# Patient Record
Sex: FEMALE | Race: WHITE | NOT HISPANIC OR LATINO | Employment: OTHER | URBAN - METROPOLITAN AREA
[De-identification: names, ages, dates, MRNs, and addresses within clinical notes are randomized per-mention and may not be internally consistent; named-entity substitution may affect disease eponyms.]

---

## 2022-07-17 ENCOUNTER — HOSPITAL ENCOUNTER (EMERGENCY)
Facility: HOSPITAL | Age: 87
Discharge: HOME | End: 2022-07-17
Attending: EMERGENCY MEDICINE
Payer: MEDICARE

## 2022-07-17 VITALS
DIASTOLIC BLOOD PRESSURE: 97 MMHG | SYSTOLIC BLOOD PRESSURE: 230 MMHG | TEMPERATURE: 98 F | HEIGHT: 58 IN | OXYGEN SATURATION: 98 % | BODY MASS INDEX: 19.94 KG/M2 | HEART RATE: 74 BPM | WEIGHT: 95 LBS | RESPIRATION RATE: 18 BRPM

## 2022-07-17 DIAGNOSIS — S22.010A COMPRESSION FRACTURE OF T1 VERTEBRA, INITIAL ENCOUNTER: ICD-10-CM

## 2022-07-17 DIAGNOSIS — S22.020A COMPRESSION FRACTURE OF T2 VERTEBRA, INITIAL ENCOUNTER: ICD-10-CM

## 2022-07-17 DIAGNOSIS — I10 ELEVATED BLOOD PRESSURE READING WITH DIAGNOSIS OF HYPERTENSION: ICD-10-CM

## 2022-07-17 DIAGNOSIS — S00.03XA CONTUSION OF SCALP, INITIAL ENCOUNTER: ICD-10-CM

## 2022-07-17 DIAGNOSIS — S09.90XA CLOSED HEAD INJURY, INITIAL ENCOUNTER: Primary | ICD-10-CM

## 2022-07-17 DIAGNOSIS — S01.01XA LACERATION OF SCALP, INITIAL ENCOUNTER: ICD-10-CM

## 2022-07-17 PROCEDURE — 25000003 PHARM REV CODE 250: Performed by: EMERGENCY MEDICINE

## 2022-07-17 PROCEDURE — 72125 CT CERVICAL SPINE WITHOUT CONTRAST: ICD-10-PCS | Mod: 26,,, | Performed by: RADIOLOGY

## 2022-07-17 PROCEDURE — 12002 RPR S/N/AX/GEN/TRNK2.6-7.5CM: CPT

## 2022-07-17 PROCEDURE — 70450 CT HEAD WITHOUT CONTRAST: ICD-10-PCS | Mod: 26,,, | Performed by: RADIOLOGY

## 2022-07-17 PROCEDURE — 72125 CT NECK SPINE W/O DYE: CPT | Mod: TC

## 2022-07-17 PROCEDURE — 99284 EMERGENCY DEPT VISIT MOD MDM: CPT | Mod: 25

## 2022-07-17 PROCEDURE — 72125 CT NECK SPINE W/O DYE: CPT | Mod: 26,,, | Performed by: RADIOLOGY

## 2022-07-17 PROCEDURE — 70450 CT HEAD/BRAIN W/O DYE: CPT | Mod: 26,,, | Performed by: RADIOLOGY

## 2022-07-17 PROCEDURE — 70450 CT HEAD/BRAIN W/O DYE: CPT | Mod: TC

## 2022-07-17 PROCEDURE — 12001 RPR S/N/AX/GEN/TRNK 2.5CM/<: CPT

## 2022-07-17 RX ORDER — ACETAMINOPHEN 325 MG/1
650 TABLET ORAL
Status: COMPLETED | OUTPATIENT
Start: 2022-07-17 | End: 2022-07-17

## 2022-07-17 RX ADMIN — ACETAMINOPHEN 650 MG: 325 TABLET ORAL at 08:07

## 2022-07-17 RX ADMIN — Medication: at 08:07

## 2022-07-18 ENCOUNTER — HOSPITAL ENCOUNTER (EMERGENCY)
Facility: HOSPITAL | Age: 87
Discharge: HOME | End: 2022-07-18
Attending: EMERGENCY MEDICINE
Payer: MEDICARE

## 2022-07-18 VITALS
DIASTOLIC BLOOD PRESSURE: 91 MMHG | RESPIRATION RATE: 18 BRPM | WEIGHT: 100 LBS | TEMPERATURE: 98 F | HEIGHT: 58 IN | HEART RATE: 93 BPM | SYSTOLIC BLOOD PRESSURE: 209 MMHG | BODY MASS INDEX: 20.99 KG/M2 | OXYGEN SATURATION: 98 %

## 2022-07-18 DIAGNOSIS — S12.9XXA: Primary | ICD-10-CM

## 2022-07-18 PROCEDURE — 12001 RPR S/N/AX/GEN/TRNK 2.5CM/<: CPT

## 2022-07-18 PROCEDURE — 25000003 PHARM REV CODE 250: Performed by: PHYSICIAN ASSISTANT

## 2022-07-18 PROCEDURE — 99285 EMERGENCY DEPT VISIT HI MDM: CPT | Mod: 25

## 2022-07-18 RX ORDER — LIDOCAINE HYDROCHLORIDE 10 MG/ML
10 INJECTION INFILTRATION; PERINEURAL
Status: COMPLETED | OUTPATIENT
Start: 2022-07-18 | End: 2022-07-18

## 2022-07-18 RX ADMIN — LIDOCAINE HYDROCHLORIDE 10 ML: 10 INJECTION, SOLUTION INFILTRATION; PERINEURAL at 05:07

## 2022-07-18 NOTE — ED PROVIDER NOTES
Encounter Date: 7/18/2022    SCRIBE #1 NOTE: I, Stephanie Ellsworth, am scribing for, and in the presence of, Geni Wesley PA-C.       History     Chief Complaint   Patient presents with    Fall     C 7 fracture / seen [facility]      Time seen by provider: 3:34 PM on 07/18/2022    Joann Saleem is a 89 y.o. female with a PMHx of HTN who presents to the ED for evaluation of possible fracture. Patient was seen in the [place] ED yesterday after a fall and had a CT of the head and C-spine. She was discharged then called back this morning due to indeterminate age of compression fracture at C7. Patients daughter reports she had an MRI in 2020 that showed slight wedging at C7. Daughter reports patient started back on infusions this month for osteoporosis. The patient does report her neck feels stiff but she denies neck pain, numbness or any other symptoms at this time. No pertinent PSHx.      The history is provided by the patient, medical records and a relative.     Review of patient's allergies indicates:   Allergen Reactions    Pcn [penicillins]     Sulfa (sulfonamide antibiotics)      Past Medical History:   Diagnosis Date    Hypertension     Hypothyroidism, unspecified     Osteopetrosis      No past surgical history on file.  No family history on file.  Social History     Tobacco Use    Smoking status: Never Smoker    Smokeless tobacco: Never Used   Substance Use Topics    Alcohol use: Never    Drug use: Never     Review of Systems   Constitutional: Negative for activity change, appetite change, chills and fever.   HENT: Negative for congestion, rhinorrhea and sore throat.    Eyes: Negative for redness and visual disturbance.   Respiratory: Negative for cough, chest tightness and shortness of breath.    Cardiovascular: Negative for chest pain.   Gastrointestinal: Negative for abdominal pain, diarrhea, nausea and vomiting.   Genitourinary: Negative for dysuria and frequency.    Musculoskeletal: Positive for neck stiffness. Negative for back pain and neck pain.   Skin: Negative for rash.   Neurological: Negative for dizziness, syncope, numbness and headaches.       Physical Exam     Vitals:    07/18/22 1833 07/18/22 1940 07/18/22 2002 07/18/22 2032   BP: (!) 168/72 (!) 201/87 (!) 191/99 (!) 209/91   Pulse: 64 74 80 93   Resp: 20   18   Temp:       TempSrc:       SpO2: 99% 98% 97% 98%   Weight:       Height:           Physical Exam    Nursing note and vitals reviewed.  Constitutional: Vital signs are normal. She appears well-developed and well-nourished. She is cooperative.  Non-toxic appearance. She does not have a sickly appearance.   HENT:   Head: Normocephalic and atraumatic.   Right Ear: External ear normal.   Left Ear: External ear normal.   Nose: Nose normal.   Mouth/Throat: Uvula is midline.   Eyes: Conjunctivae and lids are normal.   Neck:   Neck held in flexion at baseline. No cervical spinous process tenderness. No pain with ROM.    Full passive range of motion without pain.     Cardiovascular: Normal rate, regular rhythm and normal heart sounds. Exam reveals no gallop and no friction rub.    No murmur heard.  Pulmonary/Chest: Breath sounds normal. She has no wheezes. She has no rhonchi. She has no rales.   Musculoskeletal:      Cervical back: Full passive range of motion without pain. No spinous process tenderness or muscular tenderness.     Neurological: She is alert.   Equal strength and sensation to bilateral upper extremities.    Skin: Skin is warm, dry and intact. No rash noted.         ED Course   Lac Repair    Date/Time: 7/18/2022 8:40 PM  Performed by: Geni Wesley PA-C  Authorized by: Pierre Odom MD     Consent:     Consent obtained:  Verbal    Consent given by:  Patient    Procedural risks discussed: unable to be have MRI.  [place] protocol:     Patient identity confirmed:  Verbally with patient  Anesthesia:     Anesthesia method:  Local  infiltration    Local anesthetic:  Lidocaine 1% w/o epi  Laceration details:     Location:  Scalp    Length (cm):  2  Pre-procedure details:     Preparation:  Patient was prepped and draped in usual sterile fashion  Exploration:     Limited defect created (wound extended): no    Treatment:     Area cleansed with:  Saline    Amount of cleaning:  Standard    Debridement:  None    Undermining:  None  Skin repair:     Repair method:  Sutures    Suture size:  4-0    Suture material:  Prolene    Suture technique:  Simple interrupted    Number of sutures:  3  Approximation:     Approximation:  Close  Repair type:     Repair type:  Simple  Post-procedure details:     Dressing:  Open (no dressing)    Procedure completion:  Tolerated well, no immediate complications  Comments:      Patient had 3 staples removed and replaced with sutures in order to have MRI obtained      Labs Reviewed - No data to display       Imaging Results          MRI Cervical Spine Without Contrast (Final result)  Result time 07/18/22 20:12:19    Final result by Hiram Tavera MD (07/18/22 20:12:19)                 Impression:      Mild compression deformities of C7, T1 and T2 appear remote with no bone marrow edema or retropulsion.    Generalized spondylosis with central canal stenosis due to hypertrophic bony changes and congenitally short pedicles.    Normal appearance of the cervical cord.      Electronically signed by: Hiram Tavera  Date:    07/18/2022  Time:    20:12             Narrative:    EXAMINATION:  MRI CERVICAL SPINE WITHOUT CONTRAST    CLINICAL HISTORY:  Compression fracture, cervical;.    TECHNIQUE:  Multiplanar, multisequence MR images of the cervical spine were acquired without the administration of contrast.    COMPARISON:  CT, 07/17/2022 at 20:16 hours    FINDINGS:  The compression deformities of C7 and anterior superior endplates of T1 and T2 are noted.  There is no evidence of bone marrow edema or retropulsion.  No marrow  replacement is evident.    Multilevel cervical spondylosis is present with mild anterolisthesis of C4 on C5 and C5 on C6 appearing physiologic.    There is no focal disc protrusion with generalized central canal stenosis likely due to congenitally and hypertrophic bony changes.    The cord appears normal in signal and morphology throughout the cervical and upper thoracic region.  The visualized posterior cranial fossa appears unremarkable.  The surrounding visceral spaces appear unremarkable.                                 Medications   LIDOcaine HCL 10 mg/ml (1%) injection 10 mL (10 mLs Infiltration Given 7/18/22 1235)     Medical Decision Making:   History:   Old Medical Records: I decided to obtain old medical records.  Clinical Tests:   Radiological Study: Ordered and Reviewed  Other:   I have discussed this case with another health care provider.       APC / Resident Notes:   Urgent evaluation of a well appearing 89 year old female who presents with possible C7 fracture. She was seen yesterday at [place] and the over read of the CT scan showed concern for possible C7 compression fracture.  Patient denies any neck pain.  Her posture has her neck and flexion chronically.  She has equal strength and sensation of bilateral upper extremities.  She has full passive range of motion of the neck.  She has no cervical spinous process tenderness.  Discussed case with Neurosurgery, Dr. Martin, who recommends flexion and extension x-ray of the neck.  Radiologist did not feel comfortable performing this and was unsure if it would provide much information due to her chronic flexion.  They recommended MRI.  Her staples that were placed yesterday were removed and sutures placed order for patient to have MRI which showed remote C7 compression fracture with normal appearance of the cervical cord. Discussed results with patient. Return precautions given. Based on my clinical evaluation, I do not appreciate any immediate, emergent,  or life threatening condition or etiology that warrants additional workup today and feel that the patient can be discharged with close follow up care.  Patient is to follow up with their primary care provider. Case was discussed with Dr. Odom who is in agreement with the plan of care. All questions answered.        Scribe Attestation:   Scribe #1: I performed the above scribed service and the documentation accurately describes the services I performed. I attest to the accuracy of the note.    Attending Attestation:     Physician Attestation Statement for NP/PA:   I discussed this assessment and plan of this patient with the NP/PA, but I did not personally examine the patient. The face to face encounter was performed by the NP/PA.    Other NP/PA Attestation Additions:    History of Present Illness: 89-year-old female presented for new cervical spine fracture.    Medical Decision Making: Initial differential diagnosis included but not limited to old remote fracture, new fracture, and sprain.  I am in agreement with the physician assistant's  assessment, treatment, and plan of care.                    I, Geni Wesley PA-C, personally performed the services described in this documentation. All medical record entries made by the scribe were at my direction and in my presence.  I have reviewed the chart and agree that the record reflects my personal performance and is accurate and complete. Geni Wesley PA-C.  8:40 PM 07/18/2022    Clinical Impression:   Final diagnoses:  [S12.9XXA] Compression fracture of C-spine (Primary)          ED Disposition Condition    Discharge Stable        ED Prescriptions     None        Follow-up Information     Follow up With Specialties Details Why Contact Info    Meaghan Denney MD Family Medicine   [address] [facility] [address]  [phone]      Jazz Martin MD Neurosurgery   [address] [place] [address]  [place] [address]  [phone]      [facility] Emergency Dept Emergency Medicine  As needed [address]  [facility] [address] [phone]           Geni Wesley PA-C  07/18/22 2047       Pierre Odom MD  07/18/22 2050

## 2022-07-18 NOTE — ED PROVIDER NOTES
Encounter Date: 7/17/2022       History     Chief Complaint   Patient presents with    Fall     Pt. Lost her balance and fell into a sink.  Denies LOC. Minimal bleeding noted.  Hematoma noted to the top left head. Denies taking blood thinners.      89-year-old female past medical history significant for hypertension presenting after a fall at home.  Family or at bedside reports she did not witness the fall but she heard it.  Please patient with to sit down next to the sink sat rather hard which caused her head to sway to the side striking the sink.  No reports of subsequent fall to the ground.  Patient denies loss of consciousness.  Denies neck, back, chest or abdominal pain.  Denies any complaints of pain prior to striking her head.  Denies chronic anticoagulation.  Denies focal numbness, tingling weakness.  Denies visual disturbance.  Denies nausea, vomiting or diarrhea.  Reports increased pain with direct palpation over the wound site to the left lateral/posterior head.  Denies specific alleviating factors.  Reports last tetanus vaccination less than 5 years ago.        Review of patient's allergies indicates:   Allergen Reactions    Pcn [penicillins]     Sulfa (sulfonamide antibiotics)      Past Medical History:   Diagnosis Date    Hypertension     Hypothyroidism, unspecified     Osteopetrosis      History reviewed. No pertinent surgical history.  History reviewed. No pertinent family history.  Social History     Tobacco Use    Smoking status: Never Smoker    Smokeless tobacco: Never Used   Substance Use Topics    Alcohol use: Never    Drug use: Never     Review of Systems   Constitutional: Negative for activity change, appetite change and fever.   HENT: Negative for congestion, ear pain, rhinorrhea, sore throat and trouble swallowing.    Eyes: Negative for visual disturbance.   Respiratory: Negative for cough and shortness of breath.    Cardiovascular: Negative for chest pain.   Gastrointestinal:  Negative for abdominal pain, diarrhea, nausea and vomiting.   Genitourinary: Negative for dysuria and frequency.   Musculoskeletal: Negative for back pain and myalgias.   Skin: Positive for wound. Negative for rash.   Neurological: Positive for headaches. Negative for dizziness, seizures, syncope, weakness and numbness.   Hematological: Does not bruise/bleed easily.       Physical Exam     Initial Vitals [07/17/22 1942]   BP Pulse Resp Temp SpO2   (!) 230/97 74 18 98 °F (36.7 °C) 98 %      MAP       --         Physical Exam    Nursing note and vitals reviewed.  Constitutional: She appears well-developed and well-nourished.   HENT:   Head: Normocephalic.       Right Ear: Hearing, tympanic membrane, external ear and ear canal normal.   Left Ear: Hearing, tympanic membrane, external ear and ear canal normal.   Mouth/Throat: Uvula is midline, oropharynx is clear and moist and mucous membranes are normal.   Eyes: Conjunctivae and EOM are normal. Pupils are equal, round, and reactive to light.   Neck: Trachea normal and phonation normal. Neck supple. No JVD present.   No midline cervical spine tenderness, step-off or crepitus.  Kyphosis likely chronic.   Cardiovascular: Normal rate, regular rhythm, normal heart sounds and intact distal pulses.   Abdominal: Abdomen is soft. Bowel sounds are normal. She exhibits no distension. There is no abdominal tenderness. There is no rebound and no guarding.   Musculoskeletal:         General: Normal range of motion.      Cervical back: Neck supple.      Comments: No midline thoracic, lumbar sacral spinal tenderness, step-off or crepitus.  No outward signs of trauma to the anterior posterior torso.  Full active/passive range of motion intact all 4 extremities without reported tenderness.  Pelvis stable to compression with no tenderness to compression.  Patient reports ambulatory after the incident without ataxia.     Neurological: She is alert and oriented to person, place, and time.  GCS score is 15. GCS eye subscore is 4. GCS verbal subscore is 5. GCS motor subscore is 6.   No focal/lateralizing neuro deficit.   Skin: Skin is warm and dry.         ED Course   Lac Repair    Date/Time: 7/17/2022 9:21 PM  Performed by: Francisco Camacho DO  Authorized by: Francisco Camacho DO     Consent:     Consent obtained:  Verbal    Consent given by:  Patient    Risks, benefits, and alternatives were discussed: yes      Risks discussed:  Infection, need for additional repair, nerve damage, poor wound healing, poor cosmetic result, pain, vascular damage and retained foreign body    Alternatives discussed:  Delayed treatment and no treatment  Universal protocol:     Procedure explained and questions answered to patient or proxy's satisfaction: yes      Relevant documents present and verified: yes      Test results available: yes      Imaging studies available: yes      Required blood products, implants, devices, and special equipment available: yes      Site/side marked: yes      Immediately prior to procedure, a time out was called: yes      Patient identity confirmed:  Verbally with patient, arm band and hospital-assigned identification number  Anesthesia:     Anesthesia method:  Topical application    Topical anesthetic:  LET  Laceration details:     Location:  Scalp    Length (cm):  4    Depth (mm):  4  Pre-procedure details:     Preparation:  Patient was prepped and draped in usual sterile fashion and imaging obtained to evaluate for foreign bodies  Exploration:     Limited defect created (wound extended): no      Hemostasis achieved with:  Direct pressure    Imaging outcome: foreign body noted      Wound exploration: wound explored through full range of motion and entire depth of wound visualized      Wound extent: no areolar tissue violation noted, no fascia violation noted, no foreign bodies/material noted, no muscle damage noted, no nerve damage noted, no tendon damage noted, no underlying fracture  noted and no vascular damage noted      Contaminated: no    Treatment:     Area cleansed with:  Saline    Amount of cleaning:  Standard    Irrigation solution:  Sterile saline    Irrigation volume:  250    Irrigation method:  Syringe    Foreign body removal: n/a.      Debridement:  None    Undermining:  None    Scar revision: no    Skin repair:     Repair method:  Staples    Number of staples:  3  Approximation:     Approximation:  Close  Repair type:     Repair type:  Simple  Post-procedure details:     Dressing:  Non-adherent dressing    Procedure completion:  Tolerated well, no immediate complications      Labs Reviewed - No data to display       Imaging Results          CT Cervical Spine Without Contrast (In process)                CT Head Without Contrast (In process)                  Medications   LETS (LIDOcaine-TETRAcaine-EPINEPHrine) gel solution ( Topical (Top) Given 7/17/22 2025)   acetaminophen tablet 650 mg (650 mg Oral Given 7/17/22 2023)     Medical Decision Making:   Initial Assessment:   89-year-old female nontoxic-appearing, afebrile no reports of chronic anticoagulation status post low energy traumatic injury to the left lateral/posterior parietal scalp.  No palpable bony crepitus.  No loss of consciousness.  Will CT based on age with traumatic injury rule out acute intracranial pathology.  Patient with chronic kyphosis no specific midline tenderness will CT rule out acute traumatic pathology.  Topical let with evaluation for possible staple closure.  Patient hypertensive possible acute phase reactant.  Will provide Tylenol, reassess blood pressure.  Patient with history of hypertension.  Differential Diagnosis:   Exam: CT Cervical Spine Without Contrast  Exam date and time: 7/17/2022 8:15 PM  Age: 89 years old  Clinical indication: Injury or trauma; Fall; Concussion/head injury; Injury date: 7/17/22  TECHNIQUE:  Imaging protocol: Computed tomography of the cervical spine without  contrast.  Radiation optimization: All CT scans at this facility use at least one of these dose optimization  techniques: automated exposure control; mA and/or kV adjustment per patient size (includes targeted  exams where dose is matched to clinical indication); or iterative reconstruction.  COMPARISON:  CT HEAD WITHOUT CONTRAST 7/17/2022 8:13 PM  FINDINGS:  Limitations: Kyphotic cspine and unconventional position limiting evaluation.  Bones/joints: No acute cervical spine fracture. Facet hypertrophic degenerative changes  predominantly C2-6. Mild endplate and uncovertebral spurring predominantly C5-7. Mild, 20% loss of  isaias seen at T2 and 10% loss of height at T1, suspicious for compression fractures.  Discs/Spinal canal/Neural foramina: No significant disc protrusion. No severe spinal canal stenosis.  No significant neural foraminal narrowing.  Lungs: Lung apices are normal.  Vasculature: Atherosclerotic changes bilateral cervical carotid arteries.  Soft tissues: Unremarkable.  IMPRESSION:  1. No acute cervical spine fracture.  2. There are mild degenerative changes of the cervical spine.  3. Mild, 20% loss of isaias seen at T2 and 10% loss of height at T1, suspicious for compression  fractures. Recommend clinical correlation. Consider MRI.    No midline point tenderness at T1-T2 doubt acute compression fracture.      Exam: CT Head Without Contrast  Exam date and time: 7/17/2022 8:13 PM  Age: 89 years old  Clinical indication: Injury or trauma; Fall; Concussion/head injury; Without loss of consciousness;  Injury date: 7/17/22  TECHNIQUE:  Imaging protocol: Computed tomography of the head without contrast.  Radiation optimization: All CT scans at this facility use at least one of these dose optimization  techniques: automated exposure control; mA and/or kV adjustment per patient size (includes targeted  exams where dose is matched to clinical indication); or iterative reconstruction.  COMPARISON:  No relevant  prior studies available.  FINDINGS:  Brain: No intracranial hemorrhage. Generalized cerebral volume loss/atrophy. Patchy hypodensity is  seen in the periventricular and deep cerebral white matter. This change is nonspecific but may be  related to the presence of chronic ischemia with microvascular distribution. Chronic 2.5 cm  encephalomalacia right basal ganglia  Cerebral ventricles: The ventricles are moderately enlarged.  Paranasal sinuses: Visualized sinuses are unremarkable. No fluid levels.  Mastoid air cells: Visualized mastoid air cells are well aerated.  Bones/joints: Unremarkable. No acute fracture.  Soft tissues: Left parietal scalp contusion.  Vasculature: Atherosclerotic calcifications in the intracranial carotid and vertebral artery segments.  IMPRESSION:  1. No acute intracranial hemorrhage.  2. Cerebral volume loss, chronic ischemic and white matter changes as described above.  3. Left parietal scalp contusion.  Clinical Tests:   Radiological Study: Ordered and Reviewed  ED Management:  Patient tolerated wound cleansing, closure without difficulty.  CT imaging without evidence of acute traumatic pathology.  Incidental T1/T2 compression fractures reportedly chronic per the patient as reports she had been told the past that she was not a good surgical candidate for fixation.  Patient denies chronic neck pain.  No focal/lateralizing neuro deficits.  Patient medically stable for discharge home.  Reviewed and discussed wound care, close head injury precautions and return to ED precautions.  Patient and family at bedside verbalized understanding and all their questions answered to their apparent satisfaction.                      Clinical Impression:   Final diagnoses:  [S09.90XA] Closed head injury, initial encounter (Primary)  [I10] Elevated blood pressure reading with diagnosis of hypertension  [S01.01XA] Laceration of scalp, initial encounter  [S00.03XA] Contusion of scalp, initial  encounter  [S22.010A] Compression fracture of T1 vertebra, initial encounter  [S22.020A] Compression fracture of T2 vertebra, initial encounter          ED Disposition Condition    Discharge Stable        ED Prescriptions     None        Follow-up Information     Follow up With Specialties Details Why Contact Info    Meaghan Denney MD Family Medicine Schedule an appointment as soon as possible for a visit in 2 days For wound re-check and staple removal in 7 days [address] [facility] - [facility] [address]  [phone]      [facility] Emergency Dept Emergency Medicine  As needed, If symptoms worsen [address] [facility] [address] [phone]           Francisco Camacho,   07/18/22 0502

## 2022-07-18 NOTE — ED NOTES
Assumed care:  Joann Saleem is awake, alert and oriented x 3, skin warm and dry, in NAD.  Patient states that she fell yesterday and went to ER in [place].  CT from visit shows a compression Fx to T7.  Sent by primary for eval.  Denies numbness or tingling and states that she is able to ambulate with walker.  CO pain to mid back that patient says is worse today than before fall.    Patient identifiers for Joann Saleem checked and correct.  LOC:  Jonan Saleem is awake, alert, and aware of environment with an appropriate affect. She is oriented x 3 and speaking appropriately.  APPEARANCE:  She is resting comfortably and in no acute distress. She is clean and well groomed, patient's clothing is properly fastened.  SKIN:  The skin is warm and dry. She has normal skin turgor and moist mucus membranes. 3 staples to left scalp  MUSCULOSKELETAL:  She is moving all extremities well, no obvious deformities noted. Pulses intact.  Mid back pain  RESPIRATORY:  Airway is open and patent. Respirations are spontaneous and non-labored with normal effort and rate.  CARDIAC:  She has a normal rate and rhythm. No peripheral edema noted. Capillary refill < 3 seconds.  ABDOMEN:  No distention noted.  Soft and non-tender upon palpation.  NEUROLOGICAL:  PERRL. Facial expression is symmetrical. Hand grasps are equal bilaterally. Normal sensation in all extremities when touched with finger.  Allergies reported:    Review of patient's allergies indicates:   Allergen Reactions    Pcn [penicillins]     Sulfa (sulfonamide antibiotics)      OTHER NOTES:

## 2022-07-24 ENCOUNTER — HOSPITAL ENCOUNTER (EMERGENCY)
Facility: HOSPITAL | Age: 87
Discharge: HOME | End: 2022-07-24
Attending: INTERNAL MEDICINE
Payer: MEDICARE

## 2022-07-24 VITALS
WEIGHT: 95 LBS | HEART RATE: 69 BPM | OXYGEN SATURATION: 97 % | BODY MASS INDEX: 19.94 KG/M2 | TEMPERATURE: 98 F | DIASTOLIC BLOOD PRESSURE: 68 MMHG | HEIGHT: 58 IN | SYSTOLIC BLOOD PRESSURE: 174 MMHG | RESPIRATION RATE: 18 BRPM

## 2022-07-24 DIAGNOSIS — I10 HYPERTENSION: ICD-10-CM

## 2022-07-24 DIAGNOSIS — S22.000A COMPRESSION FRACTURE OF BODY OF THORACIC VERTEBRA: Primary | ICD-10-CM

## 2022-07-24 DIAGNOSIS — S32.000A COMPRESSION FRACTURE OF LUMBAR VERTEBRA, UNSPECIFIED LUMBAR VERTEBRAL LEVEL, INITIAL ENCOUNTER: ICD-10-CM

## 2022-07-24 LAB
ANION GAP SERPL CALC-SCNC: 10 MMOL/L (ref 8–16)
BASOPHILS # BLD AUTO: 0.05 K/UL (ref 0–0.2)
BASOPHILS NFR BLD: 1.1 % (ref 0–1.9)
BUN SERPL-MCNC: 22 MG/DL (ref 10–30)
CALCIUM SERPL-MCNC: 8.6 MG/DL (ref 8.7–10.5)
CHLORIDE SERPL-SCNC: 110 MMOL/L (ref 95–110)
CO2 SERPL-SCNC: 23 MMOL/L (ref 23–29)
CREAT SERPL-MCNC: 1.3 MG/DL (ref 0.5–1.4)
DIFFERENTIAL METHOD: ABNORMAL
EOSINOPHIL # BLD AUTO: 0.1 K/UL (ref 0–0.5)
EOSINOPHIL NFR BLD: 2.6 % (ref 0–8)
ERYTHROCYTE [DISTWIDTH] IN BLOOD BY AUTOMATED COUNT: 13.6 % (ref 11.5–14.5)
EST. GFR  (AFRICAN AMERICAN): 41.4 ML/MIN/1.73 M^2
EST. GFR  (NON AFRICAN AMERICAN): 36 ML/MIN/1.73 M^2
GLUCOSE SERPL-MCNC: 97 MG/DL (ref 70–110)
HCT VFR BLD AUTO: 35.1 % (ref 37–48.5)
HGB BLD-MCNC: 11.8 G/DL (ref 12–16)
IMM GRANULOCYTES # BLD AUTO: 0.01 K/UL (ref 0–0.04)
IMM GRANULOCYTES NFR BLD AUTO: 0.2 % (ref 0–0.5)
LYMPHOCYTES # BLD AUTO: 1 K/UL (ref 1–4.8)
LYMPHOCYTES NFR BLD: 21.1 % (ref 18–48)
MCH RBC QN AUTO: 30.9 PG (ref 27–31)
MCHC RBC AUTO-ENTMCNC: 33.6 G/DL (ref 32–36)
MCV RBC AUTO: 92 FL (ref 82–98)
MONOCYTES # BLD AUTO: 0.6 K/UL (ref 0.3–1)
MONOCYTES NFR BLD: 13.6 % (ref 4–15)
NEUTROPHILS # BLD AUTO: 2.8 K/UL (ref 1.8–7.7)
NEUTROPHILS NFR BLD: 61.4 % (ref 38–73)
NRBC BLD-RTO: 0 /100 WBC
PLATELET # BLD AUTO: 152 K/UL (ref 150–450)
PMV BLD AUTO: 8.7 FL (ref 9.2–12.9)
POTASSIUM SERPL-SCNC: 4.1 MMOL/L (ref 3.5–5.1)
RBC # BLD AUTO: 3.82 M/UL (ref 4–5.4)
SODIUM SERPL-SCNC: 143 MMOL/L (ref 136–145)
WBC # BLD AUTO: 4.55 K/UL (ref 3.9–12.7)

## 2022-07-24 PROCEDURE — 99285 EMERGENCY DEPT VISIT HI MDM: CPT | Mod: 25

## 2022-07-24 PROCEDURE — 93005 ELECTROCARDIOGRAM TRACING: CPT

## 2022-07-24 PROCEDURE — 72128 CT CHEST SPINE W/O DYE: CPT | Mod: TC

## 2022-07-24 PROCEDURE — 72131 CT LUMBAR SPINE WITHOUT CONTRAST: ICD-10-PCS | Mod: 26,,, | Performed by: RADIOLOGY

## 2022-07-24 PROCEDURE — 93010 ELECTROCARDIOGRAM REPORT: CPT | Mod: ,,, | Performed by: INTERNAL MEDICINE

## 2022-07-24 PROCEDURE — 72131 CT LUMBAR SPINE W/O DYE: CPT | Mod: 26,,, | Performed by: RADIOLOGY

## 2022-07-24 PROCEDURE — 80048 BASIC METABOLIC PNL TOTAL CA: CPT | Performed by: INTERNAL MEDICINE

## 2022-07-24 PROCEDURE — 63600175 PHARM REV CODE 636 W HCPCS: Performed by: INTERNAL MEDICINE

## 2022-07-24 PROCEDURE — 36415 COLL VENOUS BLD VENIPUNCTURE: CPT | Performed by: INTERNAL MEDICINE

## 2022-07-24 PROCEDURE — 93010 EKG 12-LEAD: ICD-10-PCS | Mod: ,,, | Performed by: INTERNAL MEDICINE

## 2022-07-24 PROCEDURE — 72131 CT LUMBAR SPINE W/O DYE: CPT | Mod: TC

## 2022-07-24 PROCEDURE — 72128 CT THORACIC SPINE WITHOUT CONTRAST: ICD-10-PCS | Mod: 26,,, | Performed by: RADIOLOGY

## 2022-07-24 PROCEDURE — 96372 THER/PROPH/DIAG INJ SC/IM: CPT | Performed by: INTERNAL MEDICINE

## 2022-07-24 PROCEDURE — 72128 CT CHEST SPINE W/O DYE: CPT | Mod: 26,,, | Performed by: RADIOLOGY

## 2022-07-24 PROCEDURE — 85025 COMPLETE CBC W/AUTO DIFF WBC: CPT | Performed by: INTERNAL MEDICINE

## 2022-07-24 RX ORDER — METHENAMINE HIPPURATE 1000 MG/1
1 TABLET ORAL
COMMUNITY
Start: 2022-06-21 | End: 2023-06-21

## 2022-07-24 RX ORDER — ERGOCALCIFEROL 1.25 MG/1
50000 CAPSULE ORAL
COMMUNITY
Start: 2022-06-01

## 2022-07-24 RX ORDER — OLMESARTAN MEDOXOMIL 40 MG/1
40 TABLET ORAL DAILY
COMMUNITY
Start: 2022-06-01

## 2022-07-24 RX ORDER — LEVOTHYROXINE SODIUM 125 UG/1
125 TABLET ORAL DAILY
COMMUNITY
Start: 2022-06-01

## 2022-07-24 RX ORDER — LEVOTHYROXINE SODIUM 125 UG/1
125 TABLET ORAL
COMMUNITY
Start: 2022-06-01 | End: 2023-06-01

## 2022-07-24 RX ORDER — CHLORTHALIDONE 25 MG/1
TABLET ORAL
COMMUNITY
Start: 2022-06-01

## 2022-07-24 RX ORDER — ASPIRIN 81 MG/1
81 TABLET ORAL
COMMUNITY

## 2022-07-24 RX ORDER — METHENAMINE HIPPURATE 1000 MG/1
1 TABLET ORAL 2 TIMES DAILY
COMMUNITY
Start: 2022-06-23

## 2022-07-24 RX ORDER — AMLODIPINE BESYLATE 5 MG/1
5 TABLET ORAL
COMMUNITY

## 2022-07-24 RX ORDER — CEFUROXIME AXETIL 250 MG/1
250 TABLET ORAL 2 TIMES DAILY
COMMUNITY
Start: 2022-06-03

## 2022-07-24 RX ORDER — ERGOCALCIFEROL 1.25 MG/1
50000 CAPSULE ORAL
COMMUNITY
Start: 2022-06-01 | End: 2023-06-01

## 2022-07-24 RX ORDER — MIRABEGRON 25 MG/1
25 TABLET, FILM COATED, EXTENDED RELEASE ORAL DAILY
COMMUNITY
Start: 2022-06-21

## 2022-07-24 RX ORDER — KETOROLAC TROMETHAMINE 30 MG/ML
15 INJECTION, SOLUTION INTRAMUSCULAR; INTRAVENOUS
Status: COMPLETED | OUTPATIENT
Start: 2022-07-24 | End: 2022-07-24

## 2022-07-24 RX ORDER — OLMESARTAN MEDOXOMIL 20 MG/1
TABLET ORAL
COMMUNITY
Start: 2022-04-26

## 2022-07-24 RX ORDER — CHLORTHALIDONE 25 MG/1
25 TABLET ORAL DAILY
COMMUNITY
Start: 2022-06-01

## 2022-07-24 RX ORDER — BIMATOPROST 0.1 MG/ML
1 SOLUTION/ DROPS OPHTHALMIC NIGHTLY
COMMUNITY
Start: 2022-07-21

## 2022-07-24 RX ADMIN — KETOROLAC TROMETHAMINE 15 MG: 30 INJECTION, SOLUTION INTRAMUSCULAR; INTRAVENOUS at 06:07

## 2022-07-24 NOTE — ED PROVIDER NOTES
Encounter Date: 7/24/2022       History     Chief Complaint   Patient presents with    Back Pain     Had a fall 7/17 - pt has compression fx to C7, T1 and T2. Has been having increasing back pain over the last couple of days.     HPI  Review of patient's allergies indicates:   Allergen Reactions    Pcn [penicillins]     Sulfa (sulfonamide antibiotics)      Past Medical History:   Diagnosis Date    Hypertension     Hypothyroidism, unspecified     Osteopetrosis      No past surgical history on file.  No family history on file.  Social History     Tobacco Use    Smoking status: Never Smoker    Smokeless tobacco: Never Used   Substance Use Topics    Alcohol use: Never    Drug use: Never     Review of Systems    Physical Exam     Initial Vitals   BP Pulse Resp Temp SpO2   07/24/22 1407 07/24/22 1403 07/24/22 1403 07/24/22 1403 07/24/22 1403   (!) 196/65 84 20 97.6 °F (36.4 °C) 97 %      MAP       --                Physical Exam    ED Course   Procedures  Labs Reviewed   BASIC METABOLIC PANEL - Abnormal; Notable for the following components:       Result Value    Calcium 8.6 (*)     eGFR if  41.4 (*)     eGFR if non  36.0 (*)     All other components within normal limits   CBC W/ AUTO DIFFERENTIAL - Abnormal; Notable for the following components:    RBC 3.82 (*)     Hemoglobin 11.8 (*)     Hematocrit 35.1 (*)     MPV 8.7 (*)     All other components within normal limits        ECG Results          EKG 12-lead (Preliminary result)  Result time 07/24/22 16:07:17    ED Interpretation by Juanjose Durham MD (07/24/22 16:07:17, [facility], Emergency Medicine)    Normal sinus rhythm with first-degree AV block, heart rate 72 no acute ischemic changes                            Imaging Results          CT Lumbar Spine Without Contrast (Final result)  Result time 07/24/22 17:28:05    Final result by Kris Huizar MD (07/24/22 17:28:05)                 Impression:      1. Severe  compression fracture of the T12 vertebral body with 60% height loss and 5 mm retropulsion, contributing to moderate spinal canal stenosis.  2. Additional severe compression fractures of the L1 and L5 vertebral bodies with 50% height loss and no significant retropulsion.  3. Mild superior endplate compression fractures of the C7, T1, T2, and T11 vertebral bodies with less than 20% height loss and no retropulsion.  4. Multilevel degenerative change of the spine.  5. Exophytic hypodense left renal lesion, possibly a complex cyst.  Further characterization with nonemergent renal ultrasound recommended to exclude neoplasm.  6. Severe right hydronephrosis and nephrolithiasis partially visualized.  7. Small bilateral pleural effusions.      Electronically signed by: Kris Huizar  Date:    07/24/2022  Time:    17:28             Narrative:    EXAMINATION:  CT THORACIC SPINE WITHOUT CONTRAST; CT LUMBAR SPINE WITHOUT CONTRAST    CLINICAL HISTORY:  fall;; Low back pain, trauma;    TECHNIQUE:  CT thoracic and lumbar spine performed without contrast.  Coronal and sagittal reformats provided.    COMPARISON:  None    FINDINGS:  Osseous structures demonstrate diffuse demineralization.    Mild levoconvex curvature of the thoracic spine and dextroconvex curvature of the lumbar spine.  Exaggeration of the normal thoracic kyphosis.    Severe compression fracture of the T12 vertebral body with 60% height loss and 5 mm retropulsion contributing to moderate spinal canal stenosis. Additional severe compression fractures of the L1 and L5 vertebral bodies with 50% height loss.  No significant retropulsion.    Mild superior endplate compression fractures of the C7, T1, T2 and T11 vertebral bodies with less than 20% height loss.  Remaining vertebral body heights appear maintained.    Moderate multilevel disc degeneration with intervertebral disc space narrowing, degenerative endplate change and marginal osteophyte formation, most pronounced in  the midthoracic spine and at L5-S1.  Multilevel degenerative facet arthropathy.  Findings contribute to mild-to-moderate neural foraminal and spinal canal stenosis.  No high-grade osseous spinal canal or neural foramen stenosis.    Small bilateral pleural effusions.  Marked aortoiliac calcific atherosclerosis.  Prominent calcification of the coronary arteries.  Small hiatal hernia.  Exophytic 2.2 cm left renal mass measuring slightly higher attenuation than simple fluid, may be a complex or proteinaceous cyst.  Further evaluation with renal ultrasound recommended.  Severe right hydronephrosis and nephrolithiasis partially visualized.  Diverticulosis coli.    Atrophy of the paraspinal musculature.                               CT Thoracic Spine Without Contrast (Final result)  Result time 07/24/22 17:28:05    Final result by Kris Huizar MD (07/24/22 17:28:05)                 Impression:      1. Severe compression fracture of the T12 vertebral body with 60% height loss and 5 mm retropulsion, contributing to moderate spinal canal stenosis.  2. Additional severe compression fractures of the L1 and L5 vertebral bodies with 50% height loss and no significant retropulsion.  3. Mild superior endplate compression fractures of the C7, T1, T2, and T11 vertebral bodies with less than 20% height loss and no retropulsion.  4. Multilevel degenerative change of the spine.  5. Exophytic hypodense left renal lesion, possibly a complex cyst.  Further characterization with nonemergent renal ultrasound recommended to exclude neoplasm.  6. Severe right hydronephrosis and nephrolithiasis partially visualized.  7. Small bilateral pleural effusions.      Electronically signed by: Kris Huizar  Date:    07/24/2022  Time:    17:28             Narrative:    EXAMINATION:  CT THORACIC SPINE WITHOUT CONTRAST; CT LUMBAR SPINE WITHOUT CONTRAST    CLINICAL HISTORY:  fall;; Low back pain, trauma;    TECHNIQUE:  CT thoracic and lumbar spine performed  without contrast.  Coronal and sagittal reformats provided.    COMPARISON:  None    FINDINGS:  Osseous structures demonstrate diffuse demineralization.    Mild levoconvex curvature of the thoracic spine and dextroconvex curvature of the lumbar spine.  Exaggeration of the normal thoracic kyphosis.    Severe compression fracture of the T12 vertebral body with 60% height loss and 5 mm retropulsion contributing to moderate spinal canal stenosis. Additional severe compression fractures of the L1 and L5 vertebral bodies with 50% height loss.  No significant retropulsion.    Mild superior endplate compression fractures of the C7, T1, T2 and T11 vertebral bodies with less than 20% height loss.  Remaining vertebral body heights appear maintained.    Moderate multilevel disc degeneration with intervertebral disc space narrowing, degenerative endplate change and marginal osteophyte formation, most pronounced in the midthoracic spine and at L5-S1.  Multilevel degenerative facet arthropathy.  Findings contribute to mild-to-moderate neural foraminal and spinal canal stenosis.  No high-grade osseous spinal canal or neural foramen stenosis.    Small bilateral pleural effusions.  Marked aortoiliac calcific atherosclerosis.  Prominent calcification of the coronary arteries.  Small hiatal hernia.  Exophytic 2.2 cm left renal mass measuring slightly higher attenuation than simple fluid, may be a complex or proteinaceous cyst.  Further evaluation with renal ultrasound recommended.  Severe right hydronephrosis and nephrolithiasis partially visualized.  Diverticulosis coli.    Atrophy of the paraspinal musculature.                                 Medications   ketorolac injection 15 mg (has no administration in time range)     Medical Decision Making:   ED Management:  Discussed the bowel multiple fractures in on the CT of the thoracic and spine.  High the patient states she refused having other tests include MRI or Neurosurgery Spine  evaluation.  She states that she feels comfortable now.             ED Course as of 07/24/22 1746   Sun Jul 24, 2022   1616 Complete Blood Count (CBC)(!) [PW]   1643 Basic Metabolic Panel (BMP)(!) [PW]      ED Course User Index  [PW] Juanjose Durham MD             Clinical Impression:   Final diagnoses:  [I10] Hypertension  [S22.000A] Compression fracture of body of thoracic vertebra (Primary)  [S32.000A] Compression fracture of lumbar vertebra, unspecified lumbar vertebral level, initial encounter          ED Disposition Condition    Discharge Stable        ED Prescriptions     None        Follow-up Information     Follow up With Specialties Details Why Contact Info    Meaghan Denney MD Family Medicine In 1 day  [address]  [facility] - [facility] [address] [phone]             Juanjose Durham MD  07/24/22 1746

## 2024-10-04 ENCOUNTER — HOSPITAL ENCOUNTER (EMERGENCY)
Facility: HOSPITAL | Age: 89
Discharge: HOME | End: 2024-10-04
Attending: STUDENT IN AN ORGANIZED HEALTH CARE EDUCATION/TRAINING PROGRAM
Payer: MEDICARE

## 2024-10-04 VITALS
OXYGEN SATURATION: 98 % | WEIGHT: 93 LBS | HEART RATE: 74 BPM | HEIGHT: 59 IN | DIASTOLIC BLOOD PRESSURE: 84 MMHG | RESPIRATION RATE: 16 BRPM | TEMPERATURE: 98 F | BODY MASS INDEX: 18.75 KG/M2 | SYSTOLIC BLOOD PRESSURE: 182 MMHG

## 2024-10-04 DIAGNOSIS — R42 VERTIGO: ICD-10-CM

## 2024-10-04 DIAGNOSIS — R07.9 CHEST PAIN: Primary | ICD-10-CM

## 2024-10-04 LAB
ALBUMIN SERPL BCP-MCNC: 3.7 G/DL (ref 3.5–5.2)
ALP SERPL-CCNC: 52 U/L (ref 55–135)
ALT SERPL W/O P-5'-P-CCNC: 8 U/L (ref 10–44)
ANION GAP SERPL CALC-SCNC: 10 MMOL/L (ref 8–16)
AST SERPL-CCNC: 19 U/L (ref 10–40)
BASOPHILS # BLD AUTO: 0.04 K/UL (ref 0–0.2)
BASOPHILS NFR BLD: 1 % (ref 0–1.9)
BILIRUB SERPL-MCNC: 0.9 MG/DL (ref 0.1–1)
BILIRUB UR QL STRIP: NEGATIVE
BNP SERPL-MCNC: 78 PG/ML (ref 0–99)
BUN SERPL-MCNC: 15 MG/DL (ref 10–30)
CALCIUM SERPL-MCNC: 9.3 MG/DL (ref 8.7–10.5)
CAOX CRY URNS QL MICRO: ABNORMAL
CHLORIDE SERPL-SCNC: 108 MMOL/L (ref 95–110)
CLARITY UR: CLEAR
CO2 SERPL-SCNC: 25 MMOL/L (ref 23–29)
COLOR UR: YELLOW
CREAT SERPL-MCNC: 1.5 MG/DL (ref 0.5–1.4)
DIFFERENTIAL METHOD BLD: ABNORMAL
EOSINOPHIL # BLD AUTO: 0.4 K/UL (ref 0–0.5)
EOSINOPHIL NFR BLD: 9.1 % (ref 0–8)
ERYTHROCYTE [DISTWIDTH] IN BLOOD BY AUTOMATED COUNT: 13.1 % (ref 11.5–14.5)
EST. GFR  (NO RACE VARIABLE): 32.3 ML/MIN/1.73 M^2
GLUCOSE SERPL-MCNC: 104 MG/DL (ref 70–110)
GLUCOSE UR QL STRIP: NEGATIVE
HCT VFR BLD AUTO: 40.3 % (ref 37–48.5)
HGB BLD-MCNC: 13.5 G/DL (ref 12–16)
HGB UR QL STRIP: ABNORMAL
IMM GRANULOCYTES # BLD AUTO: 0.01 K/UL (ref 0–0.04)
IMM GRANULOCYTES NFR BLD AUTO: 0.2 % (ref 0–0.5)
KETONES UR QL STRIP: ABNORMAL
LEUKOCYTE ESTERASE UR QL STRIP: NEGATIVE
LYMPHOCYTES # BLD AUTO: 1.2 K/UL (ref 1–4.8)
LYMPHOCYTES NFR BLD: 28 % (ref 18–48)
MCH RBC QN AUTO: 31.3 PG (ref 27–31)
MCHC RBC AUTO-ENTMCNC: 33.5 G/DL (ref 32–36)
MCV RBC AUTO: 94 FL (ref 82–98)
MICROSCOPIC COMMENT: ABNORMAL
MONOCYTES # BLD AUTO: 0.3 K/UL (ref 0.3–1)
MONOCYTES NFR BLD: 8.1 % (ref 4–15)
NEUTROPHILS # BLD AUTO: 2.2 K/UL (ref 1.8–7.7)
NEUTROPHILS NFR BLD: 53.6 % (ref 38–73)
NITRITE UR QL STRIP: NEGATIVE
NRBC BLD-RTO: 0 /100 WBC
PH UR STRIP: 7 [PH] (ref 5–8)
PLATELET # BLD AUTO: 142 K/UL (ref 150–450)
PMV BLD AUTO: 8.5 FL (ref 9.2–12.9)
POTASSIUM SERPL-SCNC: 4 MMOL/L (ref 3.5–5.1)
PROT SERPL-MCNC: 6.8 G/DL (ref 6–8.4)
PROT UR QL STRIP: NEGATIVE
RBC # BLD AUTO: 4.31 M/UL (ref 4–5.4)
RBC #/AREA URNS HPF: 12 /HPF (ref 0–4)
SODIUM SERPL-SCNC: 143 MMOL/L (ref 136–145)
SP GR UR STRIP: 1.01 (ref 1–1.03)
TROPONIN I SERPL DL<=0.01 NG/ML-MCNC: 0.01 NG/ML (ref 0–0.03)
TROPONIN I SERPL DL<=0.01 NG/ML-MCNC: 0.01 NG/ML (ref 0–0.03)
URN SPEC COLLECT METH UR: ABNORMAL
UROBILINOGEN UR STRIP-ACNC: NEGATIVE EU/DL
WBC # BLD AUTO: 4.18 K/UL (ref 3.9–12.7)

## 2024-10-04 PROCEDURE — 85025 COMPLETE CBC W/AUTO DIFF WBC: CPT | Performed by: STUDENT IN AN ORGANIZED HEALTH CARE EDUCATION/TRAINING PROGRAM

## 2024-10-04 PROCEDURE — 84484 ASSAY OF TROPONIN QUANT: CPT | Performed by: STUDENT IN AN ORGANIZED HEALTH CARE EDUCATION/TRAINING PROGRAM

## 2024-10-04 PROCEDURE — 93010 ELECTROCARDIOGRAM REPORT: CPT | Mod: ,,, | Performed by: INTERNAL MEDICINE

## 2024-10-04 PROCEDURE — 93005 ELECTROCARDIOGRAM TRACING: CPT

## 2024-10-04 PROCEDURE — 81000 URINALYSIS NONAUTO W/SCOPE: CPT | Performed by: STUDENT IN AN ORGANIZED HEALTH CARE EDUCATION/TRAINING PROGRAM

## 2024-10-04 PROCEDURE — 83880 ASSAY OF NATRIURETIC PEPTIDE: CPT | Performed by: STUDENT IN AN ORGANIZED HEALTH CARE EDUCATION/TRAINING PROGRAM

## 2024-10-04 PROCEDURE — 99285 EMERGENCY DEPT VISIT HI MDM: CPT | Mod: 25

## 2024-10-04 PROCEDURE — 70450 CT HEAD/BRAIN W/O DYE: CPT | Mod: 26,,, | Performed by: RADIOLOGY

## 2024-10-04 PROCEDURE — 80053 COMPREHEN METABOLIC PANEL: CPT | Performed by: STUDENT IN AN ORGANIZED HEALTH CARE EDUCATION/TRAINING PROGRAM

## 2024-10-04 PROCEDURE — 94760 N-INVAS EAR/PLS OXIMETRY 1: CPT

## 2024-10-04 PROCEDURE — 94761 N-INVAS EAR/PLS OXIMETRY MLT: CPT

## 2024-10-04 PROCEDURE — 96360 HYDRATION IV INFUSION INIT: CPT

## 2024-10-04 PROCEDURE — 70450 CT HEAD/BRAIN W/O DYE: CPT | Mod: TC

## 2024-10-04 PROCEDURE — 25000003 PHARM REV CODE 250: Performed by: STUDENT IN AN ORGANIZED HEALTH CARE EDUCATION/TRAINING PROGRAM

## 2024-10-04 RX ORDER — MECLIZINE HYDROCHLORIDE 25 MG/1
25 TABLET ORAL 3 TIMES DAILY PRN
Qty: 20 TABLET | Refills: 0 | Status: SHIPPED | OUTPATIENT
Start: 2024-10-04

## 2024-10-04 RX ADMIN — SODIUM CHLORIDE 500 ML: 9 INJECTION, SOLUTION INTRAVENOUS at 05:10

## 2024-10-04 NOTE — ED NOTES
Patient reports dizziness since Tuesday night that increases with movement.  Reports HTN >200 systolic always present when she presents to MD, but reports it resolves.  Denies any other complaints.     Pain:  Rated 0/10.     Psychosocial:  Patient is calm and cooperative.  Patients insight and judgement are appropriate to situation.  Appears clean, well maintained, with clothing appropriate to environment.  No evidence of delusions, hallucinations, or psychosis.     Neuro:  Eyes open spontaneously.  Awake, alert, oriented x 4.  Speech clear and appropriate.  Tolerating saliva secretions well.  Able to follow commands, demonstrating ability to actively and appropriately communicate within context of current conversation.  Symmetrical facial muscles.  Moving all extremities well with no noted weakness.  Adequate muscle tone present.    Movement is purposeful.        Airway:  Bilateral chest rise and fall.  RR regular and non-labored.  Air entry patent and clear x 5 lobes of the lungs.  No crepitus or subcutaneous emphysema noted on palpation.       Circulatory:  Skin warm, dry, and pink.  Radial pulses strong and regular.  Capillary refill/skin blanching less than 3 seconds to distal of 4 extremities.     Abdomen:  Abdomen soft and non-distended.  Positive normo-active bowel sounds x 4 quadrants.       Urinary:  Patient denies pain, frequency, or urgency.  Incontinent of urine routinely.     Extremities:  No redness, heat, swelling, deformity, or pain.     Skin:  Intact.  Bruising/discolorations in various degrees of healing to bilateral upper extremities noted.

## 2024-10-04 NOTE — ED NOTES
Daughter at bedside now reports patient with increased left side weakness since Tuesday evening with reports of dizziness to left side of head that increases with movement.  1 person full assist required for all transfers since.  MD notified of same.

## 2024-10-04 NOTE — ED NOTES
MD aware of elevated blood pressures.  Rounding conducted, with no safety concerns identified.  Call bell within reach.  Bed locked in low position.  SR up x 2.  Care plan reviewed with patient and family.  Opportunities provided to ask questions, with answers to meet their needs. Patient pain reassessed.  Bathroom needs addressed.  No new complaints or noted concerns.  Ambulated well at this time with 1 person assist.

## 2024-10-04 NOTE — ED PROVIDER NOTES
Encounter Date: 10/4/2024       History     Chief Complaint   Patient presents with    Dizziness     Pt presents to the er via ems with c/o increased dizziness and weakness since this past Wednesday. Pt states symptoms started after receiving covid and flu booster this past Wednesday.      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 She presents to ED with chief complaints of dizziness upon standing up 3 days ago.  Patient was reports onset of symptoms was after COVID and flu vaccine 4 days ago. Denies associated shortness of breath, chest pain, nausea, vomiting, abdominal pain, fever, cough. She denies recent illness, tells me she has been in her usual state of health lately.   Her blood pressure is notably elevated, she tells me that her blood pressure is usually elevated whenever she was in a healthcare environment, and then go back to normal 130 systolic by the time she gets home.    The history is provided by the patient. No  was used.     Review of patient's allergies indicates:   Allergen Reactions    Pcn [penicillins]     Sulfa (sulfonamide antibiotics)      Past Medical History:   Diagnosis Date    Hypertension     Hypothyroidism, unspecified     Osteopetrosis      History reviewed. No pertinent surgical history.  No family history on file.  Social History     Tobacco Use    Smoking status: Never    Smokeless tobacco: Never   Substance Use Topics    Alcohol use: Never    Drug use: Never     Review of Systems   Constitutional: Negative.    HENT: Negative.     Respiratory: Negative.     Cardiovascular: Negative.    Gastrointestinal: Negative.    Genitourinary: Negative.    Musculoskeletal: Negative.    Skin: Negative.    Neurological:  Positive for dizziness.   Hematological: Negative.    Psychiatric/Behavioral: Negative.     All  other systems reviewed and are negative.      Physical Exam     Initial Vitals [10/04/24 1455]   BP Pulse Resp Temp SpO2   (!) 240/112 89 19 98.2 °F (36.8 °C) 98 %      MAP       --         Physical Exam    Nursing note and vitals reviewed.  Constitutional: She appears well-developed.   HENT:   Head: Normocephalic.   Eyes: Pupils are equal, round, and reactive to light.   Neck: No JVD present.   Normal range of motion.  Pulmonary/Chest: Breath sounds normal. No respiratory distress.   Abdominal: Abdomen is soft.   Musculoskeletal:         General: Normal range of motion.      Cervical back: Normal range of motion.     Neurological: She is alert and oriented to person, place, and time. She has normal strength. GCS score is 15. GCS eye subscore is 4. GCS verbal subscore is 5. GCS motor subscore is 6.   Weakness in left lower extremity with a chronic.   Skin: Skin is warm. Capillary refill takes less than 2 seconds.   Psychiatric: She has a normal mood and affect.         ED Course   Procedures  Labs Reviewed   CBC W/ AUTO DIFFERENTIAL - Abnormal       Result Value    WBC 4.18      RBC 4.31      Hemoglobin 13.5      Hematocrit 40.3      MCV 94      MCH 31.3 (*)     MCHC 33.5      RDW 13.1      Platelets 142 (*)     MPV 8.5 (*)     Immature Granulocytes 0.2      Gran # (ANC) 2.2      Immature Grans (Abs) 0.01      Lymph # 1.2      Mono # 0.3      Eos # 0.4      Baso # 0.04      nRBC 0      Gran % 53.6      Lymph % 28.0      Mono % 8.1      Eosinophil % 9.1 (*)     Basophil % 1.0      Differential Method Automated     COMPREHENSIVE METABOLIC PANEL - Abnormal    Sodium 143      Potassium 4.0      Chloride 108      CO2 25      Glucose 104      BUN 15      Creatinine 1.5 (*)     Calcium 9.3      Total Protein 6.8      Albumin 3.7      Total Bilirubin 0.9      Alkaline Phosphatase 52 (*)     AST 19      ALT 8 (*)     eGFR 32.3 (*)     Anion Gap 10     URINALYSIS, REFLEX TO URINE CULTURE - Abnormal    Specimen UA Urine,  Unspecified      Color, UA Yellow      Appearance, UA Clear      pH, UA 7.0      Specific Gravity, UA 1.010      Protein, UA Negative      Glucose, UA Negative      Ketones, UA Trace (*)     Bilirubin (UA) Negative      Occult Blood UA 1+ (*)     Nitrite, UA Negative      Urobilinogen, UA Negative      Leukocytes, UA Negative      Narrative:     Preferred Collection Type->Urine, Clean Catch  Specimen Source->Urine   URINALYSIS MICROSCOPIC - Abnormal    RBC, UA 12 (*)     Ca Oxalate Sue, UA Rare      Microscopic Comment SEE COMMENT      Narrative:     Preferred Collection Type->Urine, Clean Catch  Specimen Source->Urine   TROPONIN I    Troponin I 0.013     B-TYPE NATRIURETIC PEPTIDE    BNP 78     TROPONIN I    Troponin I 0.014       EKG Readings: (Independently Interpreted)   EKG personally reviewed by me shows sinus rhythm, first-degree AV block, left axis, low voltage, septal infarct of undetermined age.  83 beats per minute.  NJ interval 260, .  No ST elevations or depression, no arrhythmia.        Imaging Results              CT Head Without Contrast (Final result)  Result time 10/04/24 19:33:00      Final result by Kun Palencia MD (10/04/24 19:33:00)                   Impression:      No CT evidence of acute intracranial abnormality. Clinical correlation and further evaluation as warranted.    Moderately advanced involutional and chronic microvascular ischemic changes as above.      Electronically signed by: Kun Palencia MD  Date:    10/04/2024  Time:    19:33               Narrative:    EXAMINATION:  CT HEAD WITHOUT CONTRAST    CLINICAL HISTORY:  Dizziness, persistent/recurrent, cardiac or vascular cause suspected;    TECHNIQUE:  Low dose axial images were obtained through the head.  Coronal and sagittal reformations were also performed. Contrast was not administered.    COMPARISON:  CT head 07/17/2022    FINDINGS:  There is generalized cerebral volume loss with compensatory sulcal widening  and ventricular enlargement.  There is patchy and confluent periventricular and supratentorial white matter hypoattenuation suggesting sequelae of chronic microvascular ischemic change.  There is a remote infarct of the right basal ganglia.  There is a remote lacunar type infarct of the left thalamus.  No definite CT evidence of acute intracranial hemorrhage.  The mastoid air cells and paranasal sinuses are clear of acute process. No acute displaced calvarial fracture appreciated.                                    X-Rays:   Independently Interpreted Readings:   Other Readings:  CT brain personally reviewed by me shows no evidence of acute stroke.  No intracranial hemorrhage, no mass, no mass effect.     Medications   sodium chloride 0.9% bolus 500 mL 500 mL (500 mLs Intravenous New Bag 10/4/24 0538)     Medical Decision Making  89-year-old female with positional dizziness.  Unremarkable neuro exam  Blood pressure is elevated, apparently this happens every time she is a the healthcare setting  Screening labs CBC, CMP without significant gross abnormalities  UA pending  CT head pending (history of hemorrhagic lacunar stroke)  Case signed out to oncoming attending at shift change  Dr. Cui: Patient care assumed at shift change to await CT results.  This CT was negative for any signs of an acute stroke.  Patient was likely suffering from benign positional vertigo.  She will be discharged home with meclizine.    Amount and/or Complexity of Data Reviewed  Labs: ordered.  Radiology: ordered.                                      Clinical Impression:  Final diagnoses:  [R07.9] Dizziness (Primary)  [R42] Vertigo          ED Disposition Condition    Discharge Stable          ED Prescriptions       Medication Sig Dispense Start Date End Date Auth. Provider    meclizine (ANTIVERT) 25 mg tablet Take 1 tablet (25 mg total) by mouth 3 (three) times daily as needed for Dizziness. 20 tablet 10/4/2024 -- Sin Cui MD           Follow-up Information       Follow up With Specialties Details Why Contact Info    Your primary care doctor  In 3 days      Al Lopez MD Otolaryngology  Symptoms are still present after the weekend [address] [facility] [address]  [phone]      [facility] Emergency Dept Emergency Medicine  As needed, If symptoms worsen [address] [facility] [address]  [phone]             Sin Cui MD  10/04/24 2000       Sin Cui MD  10/04/24 2001

## 2024-10-05 NOTE — DISCHARGE INSTRUCTIONS
Your labs and CT did not show any abnormalities.  Your likely suffering from vertigo, which is usually caused by congestion or inflammation in the inner ear.  Take meclizine for this.  Follow-up with your doctor after the weekend, and if your symptoms have not improved by then, follow-up with Dr. Lopez, the ear nose and throat doctor.  Return here as needed or if worse in any way.

## 2024-10-07 LAB
OHS QRS DURATION: 80 MS
OHS QTC CALCULATION: 418 MS

## 2025-01-22 NOTE — ED NOTES
Dr Camacho exam complete. Awaiting further orders.   
Last 3 Encounters:   01/22/25 120/76   12/13/24 (!) 140/70   10/25/24 128/70       Office Visit on 10/25/2024   Component Date Value Ref Range Status    Hemoglobin A1C 10/25/2024 8.3  % Final     Results for orders placed or performed in visit on 01/22/25   POCT glycosylated hemoglobin (Hb A1C)   Result Value Ref Range    Hemoglobin A1C 8.3 %       Completed Orders/Prescriptions   Orders Placed This Encounter   Medications    dapagliflozin (FARXIGA) 10 MG tablet     Sig: Take 1 tablet by mouth every morning     Dispense:  90 tablet     Refill:  2    DISCONTD: Tirzepatide (MOUNJARO) 12.5 MG/0.5ML SOAJ     Sig: Inject 12.5 mg into the skin once a week     Dispense:  2 mL     Refill:  5     Dose adjustment    amLODIPine (NORVASC) 5 MG tablet     Sig: Take 1 tablet by mouth daily     Dispense:  90 tablet     Refill:  1    losartan (COZAAR) 100 MG tablet     Sig: Take 1 tablet by mouth daily     Dispense:  90 tablet     Refill:  1    metoprolol succinate (TOPROL XL) 25 MG extended release tablet     Sig: Take 1 tablet by mouth daily     Dispense:  90 tablet     Refill:  1    atorvastatin (LIPITOR) 10 MG tablet     Sig: Take 1 tablet by mouth daily     Dispense:  90 tablet     Refill:  1    magnesium oxide (MAG-OX) 400 (240 Mg) MG tablet     Sig: Take 1 tablet by mouth daily     Dispense:  90 tablet     Refill:  1    loratadine (CLARITIN) 10 MG tablet     Sig: Take 1 tablet by mouth daily as needed (allergies)     Dispense:  90 tablet     Refill:  1    ferrous sulfate (FEROSUL) 325 (65 Fe) MG tablet     Sig: Take 1 tablet by mouth every other day     Dispense:  90 tablet     Refill:  0    Tirzepatide 15 MG/0.5ML SOAJ     Sig: Inject 15 mg into the skin every 7 days     Dispense:  6 mL     Refill:  0               AssessmentPlan/Medical Decision Making       Assessment & Plan  1. Diabetes Mellitus.  Her A1c level remains at 8.3, indicating no improvement. She is currently on Mounjaro 12.5 mg and Farxiga. She reports good

## 2025-03-31 ENCOUNTER — HOSPITAL ENCOUNTER (OUTPATIENT)
Dept: RADIOLOGY | Facility: HOSPITAL | Age: OVER 89
Discharge: HOME | End: 2025-03-31
Payer: MEDICARE

## 2025-03-31 DIAGNOSIS — M67.824 OTHER SPECIFIED DISORDERS OF TENDON, LEFT ELBOW: ICD-10-CM

## 2025-03-31 DIAGNOSIS — M67.824 OTHER SPECIFIED DISORDERS OF TENDON, LEFT ELBOW: Primary | ICD-10-CM

## 2025-03-31 PROCEDURE — 73090 X-RAY EXAM OF FOREARM: CPT | Mod: TC,LT

## 2025-03-31 PROCEDURE — 73090 X-RAY EXAM OF FOREARM: CPT | Mod: 26,LT,, | Performed by: RADIOLOGY

## 2025-04-08 ENCOUNTER — HOSPITAL ENCOUNTER (OUTPATIENT)
Facility: HOSPITAL | Age: OVER 89
Discharge: HOME HEALTH | End: 2025-04-09
Attending: EMERGENCY MEDICINE | Admitting: STUDENT IN AN ORGANIZED HEALTH CARE EDUCATION/TRAINING PROGRAM
Payer: MEDICARE

## 2025-04-08 DIAGNOSIS — R29.818 ACUTE FOCAL NEUROLOGICAL DEFICIT: ICD-10-CM

## 2025-04-08 DIAGNOSIS — E03.9 HYPOTHYROIDISM, UNSPECIFIED TYPE: Primary | ICD-10-CM

## 2025-04-08 PROBLEM — G45.9 TIA (TRANSIENT ISCHEMIC ATTACK): Status: ACTIVE | Noted: 2025-04-08

## 2025-04-08 PROBLEM — Z71.89 ACP (ADVANCE CARE PLANNING): Status: ACTIVE | Noted: 2025-04-08

## 2025-04-08 PROBLEM — I63.9 STROKE: Status: ACTIVE | Noted: 2025-04-08

## 2025-04-08 LAB
ABSOLUTE EOSINOPHIL (OHS): 0.29 K/UL
ABSOLUTE MONOCYTE (OHS): 0.41 K/UL (ref 0.3–1)
ABSOLUTE NEUTROPHIL COUNT (OHS): 2.38 K/UL (ref 1.8–7.7)
ALBUMIN SERPL BCP-MCNC: 3.7 G/DL (ref 3.5–5.2)
ALP SERPL-CCNC: 51 UNIT/L (ref 40–150)
ALT SERPL W/O P-5'-P-CCNC: 5 UNIT/L (ref 10–44)
AMM URATE CRY URNS QL MICRO: ABNORMAL
AMORPH CRY URNS QL MICRO: ABNORMAL
ANION GAP (OHS): 9 MMOL/L (ref 8–16)
AORTIC ROOT ANNULUS: 1.27 CM
AORTIC VALVE CUSP SEPERATION: 1.3 CM
ASCENDING AORTA: 2.35 CM
AST SERPL-CCNC: 17 UNIT/L (ref 11–45)
AV INDEX (PROSTH): 0.48
AV MEAN GRADIENT: 8 MMHG
AV PEAK GRADIENT: 12 MMHG
AV VALVE AREA BY VELOCITY RATIO: 0.8 CM²
AV VALVE AREA: 0.8 CM²
AV VELOCITY RATIO: 0.47
BACTERIA #/AREA URNS HPF: ABNORMAL /HPF
BASOPHILS # BLD AUTO: 0.06 K/UL
BASOPHILS NFR BLD AUTO: 1.3 %
BILIRUB SERPL-MCNC: 1.3 MG/DL (ref 0.1–1)
BILIRUB UR QL STRIP.AUTO: NEGATIVE
BNP SERPL-MCNC: 110 PG/ML (ref 0–99)
BSA FOR ECHO PROCEDURE: 1.33 M2
BUN SERPL-MCNC: 14 MG/DL (ref 10–30)
CA CARBONATE CRY URNS QL MICRO: ABNORMAL
CA PHOS CRY URNS QL MICRO: ABNORMAL
CALCIUM SERPL-MCNC: 9.1 MG/DL (ref 8.7–10.5)
CAOX CRY URNS QL MICRO: ABNORMAL
CHLORIDE SERPL-SCNC: 110 MMOL/L (ref 95–110)
CHOLEST SERPL-MCNC: 216 MG/DL (ref 120–199)
CHOLEST/HDLC SERPL: 3.7 {RATIO} (ref 2–5)
CLARITY UR: CLEAR
CO2 SERPL-SCNC: 23 MMOL/L (ref 23–29)
COLOR UR AUTO: YELLOW
CREAT SERPL-MCNC: 1.3 MG/DL (ref 0.5–1.4)
CV ECHO LV RWT: 0.51 CM
DOP CALC AO PEAK VEL: 1.7 M/S
DOP CALC AO VTI: 42.4 CM
DOP CALC LVOT AREA: 1.8 CM2
DOP CALC LVOT DIAMETER: 1.5 CM
DOP CALC LVOT PEAK VEL: 0.8 M/S
DOP CALC LVOT STROKE VOLUME: 35.7 CM3
DOP CALC MV VTI: 23.7 CM
DOP CALCLVOT PEAK VEL VTI: 20.2 CM
E WAVE DECELERATION TIME: 277 MSEC
E/A RATIO: 0.95
E/E' RATIO: 8 M/S
EAG (OHS): 94 MG/DL (ref 68–131)
ECHO LV POSTERIOR WALL: 0.9 CM (ref 0.6–1.1)
EJECTION FRACTION: 66 %
EPITH CASTS #/AREA URNS LPF: 0 /LPF (ref ?–0)
ERYTHROCYTE [DISTWIDTH] IN BLOOD BY AUTOMATED COUNT: 12.8 % (ref 11.5–14.5)
FATTY CASTS #/AREA URNS LPF: 0 /LPF (ref ?–0)
FRACTIONAL SHORTENING: 37.1 % (ref 28–44)
GFR SERPLBLD CREATININE-BSD FMLA CKD-EPI: 38 ML/MIN/1.73/M2
GLUCOSE SERPL-MCNC: 97 MG/DL (ref 70–110)
GLUCOSE UR QL STRIP: NEGATIVE
GRAN CASTS #/AREA URNS LPF: 0 /LPF (ref ?–0)
HBA1C MFR BLD: 4.9 % (ref 4–5.6)
HCT VFR BLD AUTO: 40.2 % (ref 37–48.5)
HDLC SERPL-MCNC: 59 MG/DL (ref 40–75)
HDLC SERPL: 27.3 % (ref 20–50)
HGB BLD-MCNC: 13.7 GM/DL (ref 12–16)
HGB UR QL STRIP: ABNORMAL
HYALINE CASTS #/AREA URNS LPF: 0 /LPF (ref 0–1)
IMM GRANULOCYTES # BLD AUTO: 0.02 K/UL (ref 0–0.04)
IMM GRANULOCYTES NFR BLD AUTO: 0.4 % (ref 0–0.5)
INR PPP: 0.9 (ref 0.8–1.2)
INTERVENTRICULAR SEPTUM: 0.9 CM (ref 0.6–1.1)
IVC DIAMETER: 0.95 CM
KETONES UR QL STRIP: NEGATIVE
LA MAJOR: 3.1 CM
LA MINOR: 2.2 CM
LDLC SERPL CALC-MCNC: 136.6 MG/DL (ref 63–159)
LEFT ATRIUM AREA SYSTOLIC (APICAL 2 CHAMBER): 5.14 CM2
LEFT ATRIUM AREA SYSTOLIC (APICAL 4 CHAMBER): 8.42 CM2
LEFT ATRIUM SIZE: 3.1 CM
LEFT ATRIUM VOLUME INDEX MOD: 11 ML/M2
LEFT ATRIUM VOLUME MOD: 14 ML
LEFT INTERNAL DIMENSION IN SYSTOLE: 2.2 CM (ref 2.1–4)
LEFT VENTRICLE DIASTOLIC VOLUME INDEX: 36.84 ML/M2
LEFT VENTRICLE DIASTOLIC VOLUME: 49 ML
LEFT VENTRICLE END SYSTOLIC VOLUME APICAL 2 CHAMBER: 9.11 ML
LEFT VENTRICLE END SYSTOLIC VOLUME APICAL 4 CHAMBER: 15.43 ML
LEFT VENTRICLE MASS INDEX: 66.8 G/M2
LEFT VENTRICLE SYSTOLIC VOLUME INDEX: 12.8 ML/M2
LEFT VENTRICLE SYSTOLIC VOLUME: 17 ML
LEFT VENTRICULAR INTERNAL DIMENSION IN DIASTOLE: 3.5 CM (ref 3.5–6)
LEFT VENTRICULAR MASS: 88.8 G
LEUKOCYTE ESTERASE UR QL STRIP: NEGATIVE
LV LATERAL E/E' RATIO: 12.8 M/S
LV SEPTAL E/E' RATIO: 5.9 M/S
LVED V (TEICH): 49.39 ML
LVES V (TEICH): 16.79 ML
LVOT MG: 1.89 MMHG
LVOT MV: 0.65 CM/S
LYMPHOCYTES # BLD AUTO: 1.52 K/UL (ref 1–4.8)
MCH RBC QN AUTO: 31.9 PG (ref 27–31)
MCHC RBC AUTO-ENTMCNC: 34.1 G/DL (ref 32–36)
MCV RBC AUTO: 94 FL (ref 82–98)
MICROSCOPIC COMMENT: ABNORMAL
MV MEAN GRADIENT: 3 MMHG
MV PEAK A VEL: 0.81 M/S
MV PEAK E VEL: 0.77 M/S
MV PEAK GRADIENT: 5 MMHG
MV STENOSIS PRESSURE HALF TIME: 70.73 MS
MV VALVE AREA BY CONTINUITY EQUATION: 1.51 CM2
MV VALVE AREA P 1/2 METHOD: 3.11 CM2
NITRITE UR QL STRIP: NEGATIVE
NON-SQ EPI CELLS #/AREA URNS HPF: 0 /HPF
NONHDLC SERPL-MCNC: 157 MG/DL
NUCLEATED RBC (/100WBC) (OHS): 0 /100 WBC
OHS CV RV/LV RATIO: 0.6 CM
OTHER ELEMENTS URNS MICRO: ABNORMAL
PH UR STRIP: 8 [PH]
PISA MRMAX VEL: 2.45 M/S
PISA TR MAX VEL: 1.8 M/S
PLATELET # BLD AUTO: 165 K/UL (ref 150–450)
PMV BLD AUTO: 8.6 FL (ref 9.2–12.9)
POTASSIUM SERPL-SCNC: 3.8 MMOL/L (ref 3.5–5.1)
PROT SERPL-MCNC: 7.2 GM/DL (ref 6–8.4)
PROT UR QL STRIP: ABNORMAL
PROTHROMBIN TIME: 10.6 SECONDS (ref 9–12.5)
PV MV: 0.64 M/S
PV PEAK GRADIENT: 3 MMHG
PV PEAK VELOCITY: 0.88 M/S
RA MAJOR: 3.09 CM
RA PRESSURE ESTIMATED: 3 MMHG
RA WIDTH: 2.22 CM
RBC # BLD AUTO: 4.29 M/UL (ref 4–5.4)
RBC #/AREA URNS HPF: >100 /HPF (ref 0–4)
RBC CASTS #/AREA URNS LPF: 0 /LPF (ref ?–0)
RELATIVE EOSINOPHIL (OHS): 6.2 %
RELATIVE LYMPHOCYTE (OHS): 32.5 % (ref 18–48)
RELATIVE MONOCYTE (OHS): 8.8 % (ref 4–15)
RELATIVE NEUTROPHIL (OHS): 50.8 % (ref 38–73)
RIGHT VENTRICLE DIASTOLIC BASEL DIMENSION: 2.1 CM
RIGHT VENTRICLE DIASTOLIC LENGTH: 4.6 CM
RIGHT VENTRICLE DIASTOLIC MID DIMENSION: 1.2 CM
RIGHT VENTRICULAR END-DIASTOLIC DIMENSION: 2.1 CM
RIGHT VENTRICULAR LENGTH IN DIASTOLE (APICAL 4-CHAMBER VIEW): 4.62 CM
RV MID DIAMA: 1.22 CM
RV TB RVSP: 5 MMHG
SINUS: 2.16 CM
SODIUM SERPL-SCNC: 142 MMOL/L (ref 136–145)
SP GR UR STRIP: 1.01
SQUAMOUS #/AREA URNS HPF: 0 /HPF
STJ: 2.26 CM
T4 FREE SERPL-MCNC: 1.09 NG/DL (ref 0.71–1.51)
TDI LATERAL: 0.06 M/S
TDI SEPTAL: 0.13 M/S
TDI: 0.1 M/S
TR MAX PG: 14 MMHG
TRI-PHOS CRY URNS QL MICRO: ABNORMAL
TRICHOMONAS UR QL MICRO: ABNORMAL /HPF
TRICUSPID ANNULAR PLANE SYSTOLIC EXCURSION: 1.3 CM
TRICUSPID VALVE PEAK A WAVE VELOCITY: 0.29 M/S
TRIGL SERPL-MCNC: 102 MG/DL (ref 30–150)
TSH SERPL-ACNC: 21.01 UIU/ML (ref 0.4–4)
TV PEAK E VEL: 0.4 M/S
TV REST PULMONARY ARTERY PRESSURE: 16 MMHG
UNIDENT CRYS URNS QL MICRO: ABNORMAL
URATE CRY URNS QL MICRO: ABNORMAL
UROBILINOGEN UR STRIP-ACNC: NEGATIVE EU/DL
WAXY CASTS #/AREA URNS LPF: 0 /LPF (ref ?–0)
WBC # BLD AUTO: 4.68 K/UL (ref 3.9–12.7)
WBC #/AREA URNS HPF: 0 /HPF (ref 0–5)
WBC CASTS #/AREA URNS LPF: 0 /LPF (ref ?–0)
WBC CLUMPS URNS QL MICRO: ABNORMAL
YEAST URNS QL MICRO: ABNORMAL /HPF
Z-SCORE OF LEFT VENTRICULAR DIMENSION IN END DIASTOLE: -2.05
Z-SCORE OF LEFT VENTRICULAR DIMENSION IN END SYSTOLE: -1.53

## 2025-04-08 PROCEDURE — 71045 X-RAY EXAM CHEST 1 VIEW: CPT | Mod: 26,,, | Performed by: RADIOLOGY

## 2025-04-08 PROCEDURE — 84439 ASSAY OF FREE THYROXINE: CPT | Performed by: EMERGENCY MEDICINE

## 2025-04-08 PROCEDURE — 70450 CT HEAD/BRAIN W/O DYE: CPT | Mod: 26,,, | Performed by: RADIOLOGY

## 2025-04-08 PROCEDURE — 93005 ELECTROCARDIOGRAM TRACING: CPT | Performed by: INTERNAL MEDICINE

## 2025-04-08 PROCEDURE — G0378 HOSPITAL OBSERVATION PER HR: HCPCS

## 2025-04-08 PROCEDURE — 82465 ASSAY BLD/SERUM CHOLESTEROL: CPT | Performed by: EMERGENCY MEDICINE

## 2025-04-08 PROCEDURE — 83036 HEMOGLOBIN GLYCOSYLATED A1C: CPT | Performed by: NURSE PRACTITIONER

## 2025-04-08 PROCEDURE — 36415 COLL VENOUS BLD VENIPUNCTURE: CPT | Performed by: EMERGENCY MEDICINE

## 2025-04-08 PROCEDURE — 70551 MRI BRAIN STEM W/O DYE: CPT | Mod: TC

## 2025-04-08 PROCEDURE — 85025 COMPLETE CBC W/AUTO DIFF WBC: CPT | Performed by: EMERGENCY MEDICINE

## 2025-04-08 PROCEDURE — 71045 X-RAY EXAM CHEST 1 VIEW: CPT | Mod: TC

## 2025-04-08 PROCEDURE — 63600175 PHARM REV CODE 636 W HCPCS: Performed by: STUDENT IN AN ORGANIZED HEALTH CARE EDUCATION/TRAINING PROGRAM

## 2025-04-08 PROCEDURE — 80053 COMPREHEN METABOLIC PANEL: CPT | Performed by: EMERGENCY MEDICINE

## 2025-04-08 PROCEDURE — 81003 URINALYSIS AUTO W/O SCOPE: CPT | Performed by: NURSE PRACTITIONER

## 2025-04-08 PROCEDURE — 96374 THER/PROPH/DIAG INJ IV PUSH: CPT

## 2025-04-08 PROCEDURE — 99447 NTRPROF PH1/NTRNET/EHR 11-20: CPT | Mod: ,,, | Performed by: PSYCHIATRY & NEUROLOGY

## 2025-04-08 PROCEDURE — 25000003 PHARM REV CODE 250: Performed by: NURSE PRACTITIONER

## 2025-04-08 PROCEDURE — 82962 GLUCOSE BLOOD TEST: CPT

## 2025-04-08 PROCEDURE — 96372 THER/PROPH/DIAG INJ SC/IM: CPT | Performed by: NURSE PRACTITIONER

## 2025-04-08 PROCEDURE — 83880 ASSAY OF NATRIURETIC PEPTIDE: CPT | Performed by: EMERGENCY MEDICINE

## 2025-04-08 PROCEDURE — 93010 ELECTROCARDIOGRAM REPORT: CPT | Mod: ,,, | Performed by: INTERNAL MEDICINE

## 2025-04-08 PROCEDURE — 63600175 PHARM REV CODE 636 W HCPCS: Performed by: NURSE PRACTITIONER

## 2025-04-08 PROCEDURE — 25500020 PHARM REV CODE 255

## 2025-04-08 PROCEDURE — 70551 MRI BRAIN STEM W/O DYE: CPT | Mod: 26,,, | Performed by: RADIOLOGY

## 2025-04-08 PROCEDURE — 36415 COLL VENOUS BLD VENIPUNCTURE: CPT | Performed by: NURSE PRACTITIONER

## 2025-04-08 PROCEDURE — 94761 N-INVAS EAR/PLS OXIMETRY MLT: CPT

## 2025-04-08 PROCEDURE — 85610 PROTHROMBIN TIME: CPT | Performed by: EMERGENCY MEDICINE

## 2025-04-08 PROCEDURE — 99285 EMERGENCY DEPT VISIT HI MDM: CPT | Mod: 25

## 2025-04-08 PROCEDURE — 70450 CT HEAD/BRAIN W/O DYE: CPT | Mod: TC

## 2025-04-08 PROCEDURE — 99222 1ST HOSP IP/OBS MODERATE 55: CPT | Mod: ,,, | Performed by: NURSE PRACTITIONER

## 2025-04-08 RX ORDER — LANOLIN ALCOHOL/MO/W.PET/CERES
800 CREAM (GRAM) TOPICAL
Status: DISCONTINUED | OUTPATIENT
Start: 2025-04-08 | End: 2025-04-09 | Stop reason: HOSPADM

## 2025-04-08 RX ORDER — LABETALOL HCL 20 MG/4 ML
10 SYRINGE (ML) INTRAVENOUS EVERY 4 HOURS PRN
Status: DISCONTINUED | OUTPATIENT
Start: 2025-04-08 | End: 2025-04-09 | Stop reason: HOSPADM

## 2025-04-08 RX ORDER — SODIUM,POTASSIUM PHOSPHATES 280-250MG
2 POWDER IN PACKET (EA) ORAL
Status: DISCONTINUED | OUTPATIENT
Start: 2025-04-08 | End: 2025-04-09 | Stop reason: HOSPADM

## 2025-04-08 RX ORDER — OXYBUTYNIN CHLORIDE 5 MG/1
10 TABLET, EXTENDED RELEASE ORAL DAILY
Status: DISCONTINUED | OUTPATIENT
Start: 2025-04-08 | End: 2025-04-09 | Stop reason: HOSPADM

## 2025-04-08 RX ORDER — ATORVASTATIN CALCIUM 40 MG/1
40 TABLET, FILM COATED ORAL DAILY
Status: DISCONTINUED | OUTPATIENT
Start: 2025-04-08 | End: 2025-04-09 | Stop reason: HOSPADM

## 2025-04-08 RX ORDER — ASPIRIN 81 MG/1
81 TABLET ORAL DAILY
Status: DISCONTINUED | OUTPATIENT
Start: 2025-04-08 | End: 2025-04-09 | Stop reason: HOSPADM

## 2025-04-08 RX ORDER — HEPARIN SODIUM 5000 [USP'U]/ML
5000 INJECTION, SOLUTION INTRAVENOUS; SUBCUTANEOUS EVERY 8 HOURS
Status: DISCONTINUED | OUTPATIENT
Start: 2025-04-08 | End: 2025-04-09 | Stop reason: HOSPADM

## 2025-04-08 RX ORDER — LEVOTHYROXINE SODIUM 25 UG/1
125 TABLET ORAL
Status: DISCONTINUED | OUTPATIENT
Start: 2025-04-09 | End: 2025-04-09 | Stop reason: HOSPADM

## 2025-04-08 RX ORDER — HYDRALAZINE HYDROCHLORIDE 20 MG/ML
10 INJECTION INTRAMUSCULAR; INTRAVENOUS ONCE
Status: COMPLETED | OUTPATIENT
Start: 2025-04-08 | End: 2025-04-08

## 2025-04-08 RX ORDER — ONDANSETRON HYDROCHLORIDE 2 MG/ML
4 INJECTION, SOLUTION INTRAVENOUS EVERY 12 HOURS PRN
Status: DISCONTINUED | OUTPATIENT
Start: 2025-04-08 | End: 2025-04-09 | Stop reason: HOSPADM

## 2025-04-08 RX ADMIN — HYDRALAZINE HYDROCHLORIDE 10 MG: 20 INJECTION INTRAMUSCULAR; INTRAVENOUS at 10:04

## 2025-04-08 RX ADMIN — OXYBUTYNIN CHLORIDE 10 MG: 5 TABLET, EXTENDED RELEASE ORAL at 04:04

## 2025-04-08 RX ADMIN — SULFUR HEXAFLUORIDE 1 ML: KIT at 03:04

## 2025-04-08 RX ADMIN — HEPARIN SODIUM 5000 UNITS: 5000 INJECTION INTRAVENOUS; SUBCUTANEOUS at 10:04

## 2025-04-08 RX ADMIN — ATORVASTATIN CALCIUM 40 MG: 40 TABLET, FILM COATED ORAL at 02:04

## 2025-04-08 RX ADMIN — ASPIRIN 81 MG: 81 TABLET, COATED ORAL at 02:04

## 2025-04-08 NOTE — SUBJECTIVE & OBJECTIVE
Past Medical History:   Diagnosis Date    Hypertension     Hypothyroidism, unspecified     Osteopetrosis     Stroke        History reviewed. No pertinent surgical history.    Review of patient's allergies indicates:   Allergen Reactions    Pcn [penicillins]     Sulfa (sulfonamide antibiotics)        No current facility-administered medications on file prior to encounter.     Current Outpatient Medications on File Prior to Encounter   Medication Sig    amLODIPine (NORVASC) 5 MG tablet Take 5 mg by mouth.    aspirin (ECOTRIN) 81 MG EC tablet Take 81 mg by mouth.    bimatoprost (LUMIGAN) 0.01 % Drop Apply 1 drop to eye nightly.    cefUROXime (CEFTIN) 250 MG tablet Take 250 mg by mouth 2 (two) times daily.    chlorthalidone (HYGROTEN) 25 MG Tab Take 25 mg by mouth once daily.    chlorthalidone (HYGROTEN) 25 MG Tab 1/2 to 1 tab po prn    ergocalciferol (ERGOCALCIFEROL) 50,000 unit Cap Take 50,000 Units by mouth every 7 days.    levothyroxine (SYNTHROID) 125 MCG tablet Take 125 mcg by mouth once daily. (Patient taking differently: Take 112 mcg by mouth once daily.)    LUMIGAN 0.01 % Drop Place 1 drop into both eyes nightly.    meclizine (ANTIVERT) 25 mg tablet Take 1 tablet (25 mg total) by mouth 3 (three) times daily as needed for Dizziness.    methenamine (HIPREX) 1 gram Tab Take 1 g by mouth 2 (two) times daily.    MYRBETRIQ 25 mg Tb24 ER tablet Take 25 mg by mouth once daily. (Patient taking differently: Take 50 mg by mouth once daily.)    olmesartan (BENICAR) 20 MG tablet Take by mouth.    olmesartan (BENICAR) 40 MG tablet Take 40 mg by mouth once daily.     Family History    None       Tobacco Use    Smoking status: Never    Smokeless tobacco: Never   Substance and Sexual Activity    Alcohol use: Never    Drug use: Never    Sexual activity: Not on file     Review of Systems   Constitutional:  Negative for activity change, chills, fatigue and fever.   HENT:  Positive for trouble swallowing (Occasional difficulty  swallowing thin liquids at baseline). Negative for congestion.    Eyes:  Negative for visual disturbance.   Respiratory:  Negative for shortness of breath.    Cardiovascular:  Negative for chest pain.   Gastrointestinal:  Negative for abdominal pain, nausea and vomiting.   Genitourinary:  Negative for dysuria.   Musculoskeletal:  Negative for myalgias.   Skin:  Negative for color change.   Neurological:  Positive for speech difficulty. Negative for dizziness, tremors, seizures, syncope, facial asymmetry, weakness, light-headedness, numbness and headaches.   Psychiatric/Behavioral:  Negative for agitation and confusion. The patient is not nervous/anxious.      Objective:     Vital Signs (Most Recent):  Pulse: 82 (04/08/25 1500)  Resp: 18 (04/08/25 1500)  BP: (!) 214/96 (04/08/25 1500)  SpO2: 98 % (04/08/25 1500) Vital Signs (24h Range):  Pulse:  [76-94] 82  Resp:  [16-20] 18  SpO2:  [96 %-100 %] 98 %  BP: (191-235)/() 214/96        There is no height or weight on file to calculate BMI.     Physical Exam  Constitutional:       General: She is not in acute distress.  HENT:      Head: Normocephalic and atraumatic.      Mouth/Throat:      Mouth: Mucous membranes are dry.   Eyes:      Extraocular Movements: Extraocular movements intact.      Pupils: Pupils are equal, round, and reactive to light.   Cardiovascular:      Rate and Rhythm: Normal rate and regular rhythm.      Pulses: Normal pulses.      Heart sounds: Normal heart sounds.   Pulmonary:      Effort: Pulmonary effort is normal. No respiratory distress.      Breath sounds: Normal breath sounds. No wheezing, rhonchi or rales.   Abdominal:      General: Bowel sounds are normal. There is no distension.      Palpations: Abdomen is soft.      Tenderness: There is no abdominal tenderness. There is no guarding or rebound.   Musculoskeletal:      Cervical back: No rigidity.      Right lower leg: No edema.      Left lower leg: No edema.   Skin:     General: Skin is  warm.   Neurological:      Mental Status: She is alert and oriented to person, place, and time.      Cranial Nerves: No facial asymmetry.      Sensory: No sensory deficit.      Comments: Slurred speech   Psychiatric:         Mood and Affect: Mood normal.         Behavior: Behavior normal.         Thought Content: Thought content normal.         Judgment: Judgment normal.              CRANIAL NERVES     CN III, IV, VI   Pupils are equal, round, and reactive to light.       Significant Labs: All pertinent labs within the past 24 hours have been reviewed.    Significant Imaging: I have reviewed all pertinent imaging results/findings within the past 24 hours.

## 2025-04-08 NOTE — H&P
[facility] Medicine  History & Physical    Patient Name: Joann Saleem  MRN: [id number]  Patient Class: OP- Observation  Admission Date: 4/8/2025  Attending Physician: Francisco Banks MD   Primary Care Provider: Brie Jimenez FNP         Patient information was obtained from patient, relative(s), and ER records.     Subjective:     Principal Problem:Acute focal neurological deficit    Chief Complaint:   Chief Complaint   Patient presents with    Speech Problem        HPI: Joann Saleem is a 89 year old female w/ PMH hypothyroidism, stroke, and HTN.  She presented to the ED for evaluation and treatment of possible stroke.  Her daughter reported that she went to bed around 9:00 p.m. last night in her usual state of health, but this morning when she woke up around 10:00 a.m. her speech was thick and slurred.  Patient's daughter states that the patient had similar symptoms when she woke up yesterday, but the symptoms resolved quickly on their own.  She and the patient deny any other obvious neurological deficits.  Code stroke was initiated on arrival to the ED. CT head performed with no findings of acute stroke.  MRI also performed with no findings of acute stroke.  Patient has had a stroke in the past and occasionally has difficulty swallowing thin liquids, but this is a chronic problem.  Patient will be admitted to hospital medicine for overnight monitoring and PT/OT evaluation.    Past Medical History:   Diagnosis Date    Hypertension     Hypothyroidism, unspecified     Osteopetrosis     Stroke        History reviewed. No pertinent surgical history.    Review of patient's allergies indicates:   Allergen Reactions    Pcn [penicillins]     Sulfa (sulfonamide antibiotics)        No current facility-administered medications on file prior to encounter.     Current Outpatient Medications on File Prior to Encounter   Medication Sig    amLODIPine (NORVASC) 5 MG tablet Take 5 mg by  mouth.    aspirin (ECOTRIN) 81 MG EC tablet Take 81 mg by mouth.    bimatoprost (LUMIGAN) 0.01 % Drop Apply 1 drop to eye nightly.    cefUROXime (CEFTIN) 250 MG tablet Take 250 mg by mouth 2 (two) times daily.    chlorthalidone (HYGROTEN) 25 MG Tab Take 25 mg by mouth once daily.    chlorthalidone (HYGROTEN) 25 MG Tab 1/2 to 1 tab po prn    ergocalciferol (ERGOCALCIFEROL) 50,000 unit Cap Take 50,000 Units by mouth every 7 days.    levothyroxine (SYNTHROID) 125 MCG tablet Take 125 mcg by mouth once daily. (Patient taking differently: Take 112 mcg by mouth once daily.)    LUMIGAN 0.01 % Drop Place 1 drop into both eyes nightly.    meclizine (ANTIVERT) 25 mg tablet Take 1 tablet (25 mg total) by mouth 3 (three) times daily as needed for Dizziness.    methenamine (HIPREX) 1 gram Tab Take 1 g by mouth 2 (two) times daily.    MYRBETRIQ 25 mg Tb24 ER tablet Take 25 mg by mouth once daily. (Patient taking differently: Take 50 mg by mouth once daily.)    olmesartan (BENICAR) 20 MG tablet Take by mouth.    olmesartan (BENICAR) 40 MG tablet Take 40 mg by mouth once daily.     Family History    None       Tobacco Use    Smoking status: Never    Smokeless tobacco: Never   Substance and Sexual Activity    Alcohol use: Never    Drug use: Never    Sexual activity: Not on file     Review of Systems   Constitutional:  Negative for activity change, chills, fatigue and fever.   HENT:  Positive for trouble swallowing (Occasional difficulty swallowing thin liquids at baseline). Negative for congestion.    Eyes:  Negative for visual disturbance.   Respiratory:  Negative for shortness of breath.    Cardiovascular:  Negative for chest pain.   Gastrointestinal:  Negative for abdominal pain, nausea and vomiting.   Genitourinary:  Negative for dysuria.   Musculoskeletal:  Negative for myalgias.   Skin:  Negative for color change.   Neurological:  Positive for speech difficulty. Negative for dizziness, tremors, seizures, syncope, facial  asymmetry, weakness, light-headedness, numbness and headaches.   Psychiatric/Behavioral:  Negative for agitation and confusion. The patient is not nervous/anxious.      Objective:     Vital Signs (Most Recent):  Pulse: 82 (04/08/25 1500)  Resp: 18 (04/08/25 1500)  BP: (!) 214/96 (04/08/25 1500)  SpO2: 98 % (04/08/25 1500) Vital Signs (24h Range):  Pulse:  [76-94] 82  Resp:  [16-20] 18  SpO2:  [96 %-100 %] 98 %  BP: (191-235)/() 214/96        There is no height or weight on file to calculate BMI.     Physical Exam  Constitutional:       General: She is not in acute distress.  HENT:      Head: Normocephalic and atraumatic.      Mouth/Throat:      Mouth: Mucous membranes are dry.   Eyes:      Extraocular Movements: Extraocular movements intact.      Pupils: Pupils are equal, round, and reactive to light.   Cardiovascular:      Rate and Rhythm: Normal rate and regular rhythm.      Pulses: Normal pulses.      Heart sounds: Normal heart sounds.   Pulmonary:      Effort: Pulmonary effort is normal. No respiratory distress.      Breath sounds: Normal breath sounds. No wheezing, rhonchi or rales.   Abdominal:      General: Bowel sounds are normal. There is no distension.      Palpations: Abdomen is soft.      Tenderness: There is no abdominal tenderness. There is no guarding or rebound.   Musculoskeletal:      Cervical back: No rigidity.      Right lower leg: No edema.      Left lower leg: No edema.   Skin:     General: Skin is warm.   Neurological:      Mental Status: She is alert and oriented to person, place, and time.      Cranial Nerves: No facial asymmetry.      Sensory: No sensory deficit.      Comments: Slurred speech   Psychiatric:         Mood and Affect: Mood normal.         Behavior: Behavior normal.         Thought Content: Thought content normal.         Judgment: Judgment normal.              CRANIAL NERVES     CN III, IV, VI   Pupils are equal, round, and reactive to light.       Significant Labs: All  pertinent labs within the past 24 hours have been reviewed.    Significant Imaging: I have reviewed all pertinent imaging results/findings within the past 24 hours.  Assessment/Plan:     Assessment & Plan  Acute focal neurological deficit  Suspected TIA associated with slurred speech  Check UA   CT head obtained   MRI obtained  Vascular Neurology consulted  Echo ordered  Will start statin for high cholesterol    Hypertension  Patient's blood pressure range in the last 24 hours was: BP  Min: 191/120  Max: 235/105.The patient's inpatient anti-hypertensive regimen is listed below:  Current Antihypertensives  labetalol 20 mg/4 mL (5 mg/mL) IV syring, Every 4 hours PRN, Intravenous    Plan  - BP is uncontrolled, will adjust as follows:  Start labetalol prn for SBP greater than 220 or DBP greater than 100  - per patient's daughter, patient does not usually have significantly high blood pressure and believes this is likely stress due to being in the ED    Hypothyroidism, unspecified  TSH 21.013, T4 1.09  Patient fluctuates between 112 mcg and 125 mcg levothyroxine (currently on 112 mcg)   -Will increase dose to 125 mcg    Suspected TIA (transient ischemic attack)  Suspected TIA associated with slurred speech  Antithrombotics for secondary stroke prevention: Antiplatelets: Aspirin: 81 mg daily    Statins for secondary stroke prevention and hyperlipidemia, if present:   Statins: Atorvastatin- 40 mg daily    Aggressive risk factor modification: HTN     Rehab efforts: The patient has been evaluated by a stroke team provider and the therapy needs have been fully considered based off the presenting complaints and exam findings. The following therapy evaluations are needed: PT evaluate and treat, OT evaluate and treat, SLP evaluate and treat    Diagnostics ordered/pending: Lipid Profile to assess cholesterol levels, MRI head without contrast to assess brain parenchyma, TTE to assess cardiac function/status     VTE prophylaxis:  Heparin 5000 units SQ every 8 hours    BP parameters: TIA: SBP <220 until imaging confirmation of no infarct     Discussed management with patient's daughter.  She is open to starting a statin/aspirin and obtaining echo.  However, she does not want extraordinary measures done given patient's age  ACP (advance care planning)  Advance Care Planning     Date: 04/08/2025    Code Status  In light of the patients advanced and life limiting illness,I engaged the the patient and family in a voluntary conversation about the patient's preferences for care  at the very end of life. The patient wishes to have a natural, peaceful death.  Along those lines, the patient does not wish to have CPR or other invasive treatments performed when her heart and/or breathing stops. I communicated to the patient and family that a DNR order would be placed in her medical record to reflect this preference.    A total of 5 min was spent on advance care planning, goals of care discussion, emotional support, formulating and communicating prognosis and exploring burden/benefit of various approaches of treatment. This discussion occurred on a fully voluntary basis with the verbal consent of the patient and/or family.       (Unable to place order, will have Dr. Banks place it)  VTE Risk Mitigation (From admission, onward)           Ordered     heparin (porcine) injection 5,000 Units  Every 8 hours         04/08/25 1420     IP VTE HIGH RISK PATIENT  Once         04/08/25 1420     Place sequential compression device  Until discontinued         04/08/25 1420                         On 04/08/2025, patient should be placed in hospital observation services under my care in collaboration with Francisco Banks MD.           Sara Bruno NP  Department of [facility] Medicine  [facility] Emergency Dept

## 2025-04-08 NOTE — ASSESSMENT & PLAN NOTE
Patient's blood pressure range in the last 24 hours was: BP  Min: 191/120  Max: 235/105.The patient's inpatient anti-hypertensive regimen is listed below:  Current Antihypertensives  labetalol 20 mg/4 mL (5 mg/mL) IV syring, Every 4 hours PRN, Intravenous    Plan  - BP is uncontrolled, will adjust as follows:  Start labetalol prn for SBP greater than 220 or DBP greater than 100  - per patient's daughter, patient does not usually have significantly high blood pressure and believes this is likely stress due to being in the ED

## 2025-04-08 NOTE — ASSESSMENT & PLAN NOTE
TSH 21.013, T4 1.09  Patient fluctuates between 112 mcg and 125 mcg levothyroxine (currently on 112 mcg)   -Will increase dose to 125 mcg

## 2025-04-08 NOTE — ASSESSMENT & PLAN NOTE
Suspected TIA associated with slurred speech  Check UA   CT head obtained   MRI obtained  Vascular Neurology consulted  Echo ordered  Will start statin for high cholesterol

## 2025-04-08 NOTE — ASSESSMENT & PLAN NOTE
Suspected TIA associated with slurred speech  Antithrombotics for secondary stroke prevention: Antiplatelets: Aspirin: 81 mg daily    Statins for secondary stroke prevention and hyperlipidemia, if present:   Statins: Atorvastatin- 40 mg daily    Aggressive risk factor modification: HTN     Rehab efforts: The patient has been evaluated by a stroke team provider and the therapy needs have been fully considered based off the presenting complaints and exam findings. The following therapy evaluations are needed: PT evaluate and treat, OT evaluate and treat, SLP evaluate and treat    Diagnostics ordered/pending: Lipid Profile to assess cholesterol levels, MRI head without contrast to assess brain parenchyma, TTE to assess cardiac function/status     VTE prophylaxis: Heparin 5000 units SQ every 8 hours    BP parameters: TIA: SBP <220 until imaging confirmation of no infarct     Discussed management with patient's daughter.  She is open to starting a statin/aspirin and obtaining echo.  However, she does not want extraordinary measures done given patient's age

## 2025-04-08 NOTE — PLAN OF CARE
[facility] Emergency Dept  Initial Discharge Assessment       Primary Care Provider: Brie Jimenez FNP    Admission Diagnosis: Acute focal neurological deficit [R29.818]    Admission Date: 4/8/2025  Expected Discharge Date: 4/9/2025    DC assessment completed with patient at bedside. Verified information on facesheet as correct. Pt lives at listed address with daughter shantel. Reports she has help if needed from her. PCP is Lashae Jimenez, NP- reports last apt was last week.  [facility]. States had Pt with [facility], but was Dc'd 4/7/25 from their care. Would like to go back in with them at DC. . DME has a rollator, BP machine, Shower chair, bedside commode and a wheelchair. Reports daughter assist with ADLs and drives her to apts. She will  also provide transportation home upon DC. Reports taking home medications as prescribed and can currently afford them. Verified insurance on file. Denies recent inpt stay in last 30 days.  DC plan is Home with Home Health. [facility].            Payor: MEDICARE / Plan: MEDICARE PART A & B / Product Type: Government /     Extended Emergency Contact Information  Primary Emergency Contact: Shantel Saleem  Address: [address] [place] [address]  Mobile Phone: [phone]  Relation: Daughter  Preferred language: English   needed? No    Discharge Plan A: Home Health, Other  Discharge Plan B: Home with family      [id number] [place] [id number] [address]  [address] [place] [id number]  Phone: [phone] Fax: [phone]      Initial Assessment (most recent)       Adult Discharge Assessment - 04/08/25 1706          Discharge Assessment    Assessment Type Discharge Planning Assessment     Confirmed/corrected address, phone number and insurance Yes     Confirmed Demographics Correct on Facesheet     Source of Information patient     When was your last  doctors appointment? --   last week    Does patient/caregiver understand observation status Yes     Communicated FABIANO with patient/caregiver Yes     Reason For Admission Acute neurological focal deficit     People in Home child(oz), adult     Do you expect to return to your current living situation? Yes     Do you have help at home or someone to help you manage your care at home? Yes     Who are your caregiver(s) and their phone number(s)? Daughter Shantel [phone]     Prior to hospitilization cognitive status: Alert/Oriented;No Deficits     Current cognitive status: Alert/Oriented;No Deficits     Walking or Climbing Stairs Difficulty yes     Walking or Climbing Stairs ambulation difficulty, requires equipment     Mobility Management wheelchair, rollator, bedside commode, shower chair, Bp machine.     Dressing/Bathing Difficulty yes     Dressing/Bathing bathing difficulty, assistance 1 person     Dressing/Bathing Management daughter assist     Home Accessibility wheelchair accessible   has 2 stairs to get into the kitchen    Equipment Currently Used at Home bedside commode;blood pressure machine;shower chair;rollator     Readmission within 30 days? No     Patient currently being followed by outpatient case management? No     Do you currently have service(s) that help you manage your care at home? Yes     How Many hours does patient receive services 3     Name and Contact number of agency [facility], DC'd from Service 4/7/25     Is the pt/caregiver preference to resume services with current agency Yes     Do you take prescription medications? Yes     Do you have prescription coverage? Yes     Coverage [employer]     Do you have any problems affording any of your prescribed medications? No     Is the patient taking medications as prescribed? yes     Who is going to help you get home at discharge? Daughter     How do you get to doctors appointments? family or friend will provide     Are you on  dialysis? No     Do you take coumadin? No     Discharge Plan A Home Health;Other     Discharge Plan B Home with family     DME Needed Upon Discharge  none     Discharge Plan discussed with: Patient

## 2025-04-08 NOTE — ED TRIAGE NOTES
Pt brought in by EMS for c/o slurred speech. Daughter reports that pt went to bed at 2130 last night. Pt woke up at 1000 AM and daughter noticed that she had slurred speech. Aox4. Pt hypertensive 225/100. Daughter also reported that she had episode of slurred speech that lasted 20 min and resolved.

## 2025-04-08 NOTE — ASSESSMENT & PLAN NOTE
Advance Care Planning     Date: 04/08/2025    Code Status  In light of the patients advanced and life limiting illness,I engaged the the patient and family in a voluntary conversation about the patient's preferences for care  at the very end of life. The patient wishes to have a natural, peaceful death.  Along those lines, the patient does not wish to have CPR or other invasive treatments performed when her heart and/or breathing stops. I communicated to the patient and family that a DNR order would be placed in her medical record to reflect this preference.    A total of 5 min was spent on advance care planning, goals of care discussion, emotional support, formulating and communicating prognosis and exploring burden/benefit of various approaches of treatment. This discussion occurred on a fully voluntary basis with the verbal consent of the patient and/or family.       (Unable to place order, will have Dr. Banks place it)

## 2025-04-08 NOTE — TELEMEDICINE CONSULT
[facility]  Vascular Neurology  Comprehensive Stroke Center  TeleVascular Neurology Interprofessional Consult Note           Consult Information  Consult to Telemedicine - Acute Stroke  Consult performed by: Bruce Mcqueen MD  Consult ordered by: Sin Cui MD          Consulting Provider: SIN CUI   Patient Location:  [facility] EMERGENCY DEPARTMENT     Summary of patient's symptoms:  Slurred speech and subjective L side weakness.  Symptoms noted yesterday and again today.     Images personally reviewed and interpreted:  Study: Head CT  Study Interpretation: chronic changes, no acute findings     Assessment and plan:  Possible small vessel stroke  Outside of lytic window, no signs or symptoms of LVO.    Do not recommend MRI at her age, will not change treatment course.  Risk modification and rehab efforts indicated.    Admit for risk assessment and rehab evaluation.     I spent approximately 15 minutes on this encounter. More than half of that time was spent communicating with the consulting provider and coordinating patient care.       Bruce Mcqueen MD        This encounter was conducted as an interprofessional communication between providers at the [facility] and vascular neurologist. The interaction was completed over the phone or via secure messaging (electronic medical record - [facility] Secure Chat).     Once this note was completed, a written copy was sent back to the provider via fax or electronic medical record.

## 2025-04-08 NOTE — PLAN OF CARE
04/08/25 1614   ROMAN Message   Medicare Outpatient and Observation Notification regarding financial responsibility Given to patient/caregiver;Explained to patient/caregiver;Signed/date by patient/caregiver   Date ROMAN was signed 04/08/25   Time ROMAN was signed 161

## 2025-04-08 NOTE — ED PROVIDER NOTES
"Encounter Date: 4/8/2025       History     Chief Complaint   Patient presents with    Speech Problem     89-year-old female brought from home where she lives with a daughter for evaluation and treatment of possible stroke.  Daughter states patient was fine when she went to bed around 9:00 p.m., but this morning when she woke up at 10:00 a.m., her speech was "thick".  No other obvious neurologic deficits according to the daughter, who is a retired nurse.  Daughter states that patient had similar symptoms when she woke up yesterday, but the symptoms resolved quickly on their own.  She does have a history of a stroke several years ago.      Review of patient's allergies indicates:   Allergen Reactions    Pcn [penicillins]     Sulfa (sulfonamide antibiotics)      Past Medical History:   Diagnosis Date    Hypertension     Hypothyroidism, unspecified     Osteopetrosis      History reviewed. No pertinent surgical history.  No family history on file.  Social History[1]  Review of Systems   Neurological:  Positive for speech difficulty.   All other systems reviewed and are negative.      Physical Exam     Initial Vitals [04/08/25 1119]   BP Pulse Resp Temp SpO2   (!) 225/100 89 20 -- 96 %      MAP       --         Physical Exam    Nursing note and vitals reviewed.  Constitutional: She appears well-developed and well-nourished. She is not diaphoretic. No distress.   Pleasant, smiling, 89-year-old female, no acute distress.  Blood pressure is elevated.  Daughter states this is secondary to her being in the ER.   HENT:   Head: Normocephalic and atraumatic.   Nose: Nose normal. Mouth/Throat: Oropharynx is clear and moist. No oropharyngeal exudate.   Eyes: Conjunctivae and EOM are normal. Pupils are equal, round, and reactive to light.   Neck: Neck supple. No tracheal deviation present.   Normal range of motion.  Cardiovascular:  Normal rate, regular rhythm, normal heart sounds and intact distal pulses.           No murmur " "heard.  Pulmonary/Chest: Breath sounds normal. No stridor. No respiratory distress. She has no wheezes.   Abdominal: Abdomen is soft. Bowel sounds are normal. She exhibits no distension. There is no abdominal tenderness.   Musculoskeletal:         General: No tenderness or edema. Normal range of motion.      Cervical back: Normal range of motion and neck supple.      Comments: Restricted motion of the upper back and neck secondary to chronic spinal changes, otherwise normal musculoskeletal exam.     Neurological: She is alert. She has normal strength. No cranial nerve deficit or sensory deficit. GCS score is 15. GCS eye subscore is 4. GCS verbal subscore is 5. GCS motor subscore is 6.   No facial droop, smile symmetric, tongue protrudes normally.  Speech is somewhat "thick", as daughter states.  Normal word usage however.  Patient moves all extremities equally well.  No pronator drift.  No discernible weakness or sensory deficit in the extremities.    Skin: Skin is warm and dry. Capillary refill takes less than 2 seconds. No rash noted. No erythema.   Psychiatric: She has a normal mood and affect. Her behavior is normal.         ED Course   Critical Care    Date/Time: 4/8/2025 1:47 PM    Performed by: Sin Cui MD  Authorized by: Sin Cui MD  Direct patient critical care time: 27 minutes  Additional history critical care time: 7 minutes  Ordering / reviewing critical care time: 6 minutes  Documentation critical care time: 15 minutes  Consulting other physicians critical care time: 4 minutes  Consult with family critical care time: 6 minutes  Total critical care time (exclusive of procedural time) : 65 minutes  Critical care time was exclusive of separately billable procedures and treating other patients and teaching time.  Critical care was necessary to treat or prevent imminent or life-threatening deterioration of the following conditions: CNS failure or compromise.  Critical care was time spent " personally by me on the following activities: development of treatment plan with patient or surrogate, discussions with consultants, interpretation of cardiac output measurements, evaluation of patient's response to treatment, examination of patient, obtaining history from patient or surrogate, ordering and performing treatments and interventions, ordering and review of laboratory studies, ordering and review of radiographic studies, pulse oximetry, re-evaluation of patient's condition, review of old charts and transcutaneous pacing.        Labs Reviewed   COMPREHENSIVE METABOLIC PANEL - Abnormal       Result Value    Sodium 142      Potassium 3.8      Chloride 110      CO2 23      Glucose 97      BUN 14      Creatinine 1.3      Calcium 9.1      Protein Total 7.2      Albumin 3.7      Bilirubin Total 1.3 (*)     ALP 51      AST 17      ALT 5 (*)     Anion Gap 9      eGFR 38 (*)    TSH - Abnormal    TSH 21.013 (*)    LIPID PANEL - Abnormal    Cholesterol Total 216 (*)     Triglyceride 102      HDL Cholesterol 59      LDL Cholesterol 136.6      HDL/Cholesterol Ratio 27.3      Cholesterol/HDL Ratio 3.7      Non HDL Cholesterol 157     B-TYPE NATRIURETIC PEPTIDE - Abnormal     (*)    CBC WITH DIFFERENTIAL - Abnormal    WBC 4.68      RBC 4.29      HGB 13.7      HCT 40.2      MCV 94      MCH 31.9 (*)     MCHC 34.1      RDW 12.8      Platelet Count 165      MPV 8.6 (*)     Nucleated RBC 0      Neut % 50.8      Lymph % 32.5      Mono % 8.8      Eos % 6.2      Basophil % 1.3      Imm Grans % 0.4      Neut # 2.38      Lymph # 1.52      Mono # 0.41      Eos # 0.29      Baso # 0.06      Imm Grans # 0.02     PROTIME-INR - Normal    PT 10.6      INR 0.9     CBC W/ AUTO DIFFERENTIAL    Narrative:     The following orders were created for panel order CBC W/ AUTO DIFFERENTIAL.  Procedure                               Abnormality         Status                     ---------                               -----------          ------                     CBC with Differential[[id number]]       Abnormal            Final result                 Please view results for these tests on the individual orders.   EXTRA TUBES    Narrative:     The following orders were created for panel order EXTRA TUBES.  Procedure                               Abnormality         Status                     ---------                               -----------         ------                     Gold Top Hold[[id number]]                                                                Please view results for these tests on the individual orders.   GOLD TOP HOLD   POCT GLUCOSE MONITORING CONTINUOUS     EKG Readings: (Independently Interpreted)   EKG personally reviewed by me shows sinus rhythm, first-degree AV block, premature supraventricular complexes and low voltage.  90 beats per minute, KS interval 224, .  No obvious ischemic change or arrhythmia.       Imaging Results              MRI Brain Without Contrast (In process)  Result time 04/08/25 13:44:16                     X-Ray Chest AP Portable (Final result)  Result time 04/08/25 11:31:56      Final result by Figueroa Baptiste MD (04/08/25 11:31:56)                   Impression:      Mild chronic changes of interstitial fibrosis without focal consolidation.      Electronically signed by: Figueroa Baptiste  Date:    04/08/2025  Time:    11:31               Narrative:    EXAMINATION:  XR CHEST AP PORTABLE    CLINICAL HISTORY:  Stroke;    TECHNIQUE:  Portable view of the chest was performed.    COMPARISON:  None.    FINDINGS:  Mild chronic changes of interstitial fibrosis.  Dependent atelectasis at the lung bases.  No focal consolidation.  Heart size is mildly enlarged.  Trachea midline.  Bony thorax intact with demineralization.                                       CT Head Without Contrast (Final result)  Result time 04/08/25 11:31:20      Final result by Figueroa Baptiste MD (04/08/25 11:31:20)                    Impression:      Cortical atrophy with periventricular deep white matter change consistent with chronic small vessel ischemic disease.    Chronic lacunar infarcts of the basal ganglia.      Electronically signed by: Figueroa Baptiste  Date:    04/08/2025  Time:    11:31               Narrative:    EXAMINATION:  CT HEAD WITHOUT CONTRAST    CLINICAL HISTORY:  Neuro deficit, acute, stroke suspected;    TECHNIQUE:  Low dose axial images were obtained through the head.  Coronal and sagittal reformations were also performed. Contrast was not administered.    COMPARISON:  CT 10/04/2024.    FINDINGS:  There is no acute hemorrhage or infarction.  There is cortical atrophy.  There are periventricular deep white matter changes consistent with chronic small vessel ischemic disease.  Chronic lacunar infarcts of the bilateral basal ganglia.    No extra-axial fluid collections.  Ventricles are normal in size, shape and configuration.  The basal cisterns are patent.    The imaged paranasal sinuses and ethmoid air cells are well aerated.    The mastoid air cells and middle ears are normally pneumatized.                                    X-Rays:   Independently Interpreted Readings:   Other Readings:  CT brain personally reviewed by me shows chronic lacunar infarcts of the basal ganglia, plus chronic age-related changes.  No obvious intracranial hemorrhage, no mass, no mass effect.  No skull fracture.    Chest x-ray personally reviewed by me shows clear lungs bilaterally other than chronic fibrotic changes, no consolidations.  No infiltrate.  No effusion or edema.  Cardiac silhouette mildly enlarged.  Normal skeletal structures.    Medications - No data to display  Medical Decision Making  Differential includes TIA, CVA, age-related changes, other neurovascular cause, etc.    Upon arrival, stroke orders were entered, and neurovascular has seen the patient.  No obvious changes on brain CT.  Recommended admission for MRI.  Not a  candidate for tPA.  Did not recommend CTA.  I spoke to Dr. Banks who will admit the patient and will attempt an MRI.  Patient has kyphosis and restricted range of motion of the upper spine and neck may make MRI difficult.  I spoke to the patient's daughter, who is a retired nurse and she understands the plan of care and agrees with it.  Patient also agrees.    Amount and/or Complexity of Data Reviewed  Labs: ordered.  Radiology: ordered.                              Thrombolysis Candidate? No,  Delays to Thrombolysis?          Clinical Impression:  Final diagnoses:  [R29.818] Acute focal neurological deficit          ED Disposition Condition    Observation Stable                  [1]   Social History  Tobacco Use    Smoking status: Never    Smokeless tobacco: Never   Substance Use Topics    Alcohol use: Never    Drug use: Never        Sin Cui MD  04/08/25 2999

## 2025-04-08 NOTE — HPI
Joann Saleem is a 89 year old female w/ PMH hypothyroidism, stroke, and HTN.  She presented to the ED for evaluation and treatment of possible stroke.  Her daughter reported that she went to bed around 9:00 p.m. last night in her usual state of health, but this morning when she woke up around 10:00 a.m. her speech was thick and slurred.  Patient's daughter states that the patient had similar symptoms when she woke up yesterday, but the symptoms resolved quickly on their own.  She and the patient deny any other obvious neurological deficits.  Code stroke was initiated on arrival to the ED. CT head performed with no findings of acute stroke.  MRI also performed with no findings of acute stroke.  Patient has had a stroke in the past and occasionally has difficulty swallowing thin liquids, but this is a chronic problem.  Patient will be admitted to hospital medicine for overnight monitoring and PT/OT evaluation.

## 2025-04-09 VITALS
RESPIRATION RATE: 19 BRPM | OXYGEN SATURATION: 96 % | WEIGHT: 106.25 LBS | TEMPERATURE: 98 F | DIASTOLIC BLOOD PRESSURE: 61 MMHG | HEIGHT: 59 IN | BODY MASS INDEX: 21.42 KG/M2 | SYSTOLIC BLOOD PRESSURE: 134 MMHG | HEART RATE: 69 BPM

## 2025-04-09 LAB
ABSOLUTE EOSINOPHIL (OHS): 0.15 K/UL
ABSOLUTE MONOCYTE (OHS): 0.49 K/UL (ref 0.3–1)
ABSOLUTE NEUTROPHIL COUNT (OHS): 3.63 K/UL (ref 1.8–7.7)
ALBUMIN SERPL BCP-MCNC: 3.3 G/DL (ref 3.5–5.2)
ALP SERPL-CCNC: 47 UNIT/L (ref 40–150)
ALT SERPL W/O P-5'-P-CCNC: <5 UNIT/L (ref 10–44)
ANION GAP (OHS): 10 MMOL/L (ref 8–16)
APTT PPP: 35.2 SECONDS (ref 21–32)
AST SERPL-CCNC: 18 UNIT/L (ref 11–45)
BASOPHILS # BLD AUTO: 0.07 K/UL
BASOPHILS NFR BLD AUTO: 1.2 %
BILIRUB SERPL-MCNC: 1.8 MG/DL (ref 0.1–1)
BUN SERPL-MCNC: 16 MG/DL (ref 10–30)
CALCIUM SERPL-MCNC: 8.7 MG/DL (ref 8.7–10.5)
CHLORIDE SERPL-SCNC: 111 MMOL/L (ref 95–110)
CO2 SERPL-SCNC: 20 MMOL/L (ref 23–29)
CREAT SERPL-MCNC: 1.1 MG/DL (ref 0.5–1.4)
ERYTHROCYTE [DISTWIDTH] IN BLOOD BY AUTOMATED COUNT: 12.8 % (ref 11.5–14.5)
GFR SERPLBLD CREATININE-BSD FMLA CKD-EPI: 47 ML/MIN/1.73/M2
GLUCOSE SERPL-MCNC: 97 MG/DL (ref 70–110)
HCT VFR BLD AUTO: 37.9 % (ref 37–48.5)
HGB BLD-MCNC: 12.8 GM/DL (ref 12–16)
IMM GRANULOCYTES # BLD AUTO: 0.02 K/UL (ref 0–0.04)
IMM GRANULOCYTES NFR BLD AUTO: 0.4 % (ref 0–0.5)
INR PPP: 1 (ref 0.8–1.2)
LYMPHOCYTES # BLD AUTO: 1.35 K/UL (ref 1–4.8)
MAGNESIUM SERPL-MCNC: 2 MG/DL (ref 1.6–2.6)
MCH RBC QN AUTO: 31.8 PG (ref 27–31)
MCHC RBC AUTO-ENTMCNC: 33.8 G/DL (ref 32–36)
MCV RBC AUTO: 94 FL (ref 82–98)
NUCLEATED RBC (/100WBC) (OHS): 0 /100 WBC
OHS QRS DURATION: 64 MS
OHS QTC CALCULATION: 433 MS
PHOSPHATE SERPL-MCNC: 2.7 MG/DL (ref 2.7–4.5)
PLATELET # BLD AUTO: 156 K/UL (ref 150–450)
PMV BLD AUTO: 8.4 FL (ref 9.2–12.9)
POCT GLUCOSE: 91 MG/DL (ref 70–110)
POTASSIUM SERPL-SCNC: 3.6 MMOL/L (ref 3.5–5.1)
PROT SERPL-MCNC: 6.2 GM/DL (ref 6–8.4)
PROTHROMBIN TIME: 10.9 SECONDS (ref 9–12.5)
RBC # BLD AUTO: 4.02 M/UL (ref 4–5.4)
RELATIVE EOSINOPHIL (OHS): 2.6 %
RELATIVE LYMPHOCYTE (OHS): 23.6 % (ref 18–48)
RELATIVE MONOCYTE (OHS): 8.6 % (ref 4–15)
RELATIVE NEUTROPHIL (OHS): 63.6 % (ref 38–73)
SODIUM SERPL-SCNC: 141 MMOL/L (ref 136–145)
TROPONIN I SERPL DL<=0.01 NG/ML-MCNC: 0.03 NG/ML
WBC # BLD AUTO: 5.71 K/UL (ref 3.9–12.7)

## 2025-04-09 PROCEDURE — 85610 PROTHROMBIN TIME: CPT | Performed by: NURSE PRACTITIONER

## 2025-04-09 PROCEDURE — G0378 HOSPITAL OBSERVATION PER HR: HCPCS

## 2025-04-09 PROCEDURE — 92610 EVALUATE SWALLOWING FUNCTION: CPT

## 2025-04-09 PROCEDURE — 84484 ASSAY OF TROPONIN QUANT: CPT | Performed by: NURSE PRACTITIONER

## 2025-04-09 PROCEDURE — 97162 PT EVAL MOD COMPLEX 30 MIN: CPT

## 2025-04-09 PROCEDURE — 97165 OT EVAL LOW COMPLEX 30 MIN: CPT

## 2025-04-09 PROCEDURE — 92523 SPEECH SOUND LANG COMPREHEN: CPT

## 2025-04-09 PROCEDURE — 63600175 PHARM REV CODE 636 W HCPCS: Performed by: NURSE PRACTITIONER

## 2025-04-09 PROCEDURE — 80053 COMPREHEN METABOLIC PANEL: CPT | Performed by: NURSE PRACTITIONER

## 2025-04-09 PROCEDURE — 99238 HOSP IP/OBS DSCHRG MGMT 30/<: CPT | Mod: ,,, | Performed by: NURSE PRACTITIONER

## 2025-04-09 PROCEDURE — 25000003 PHARM REV CODE 250: Performed by: NURSE PRACTITIONER

## 2025-04-09 PROCEDURE — 85730 THROMBOPLASTIN TIME PARTIAL: CPT | Performed by: NURSE PRACTITIONER

## 2025-04-09 PROCEDURE — 96372 THER/PROPH/DIAG INJ SC/IM: CPT | Performed by: NURSE PRACTITIONER

## 2025-04-09 PROCEDURE — 97535 SELF CARE MNGMENT TRAINING: CPT

## 2025-04-09 PROCEDURE — 83735 ASSAY OF MAGNESIUM: CPT | Performed by: NURSE PRACTITIONER

## 2025-04-09 PROCEDURE — 85025 COMPLETE CBC W/AUTO DIFF WBC: CPT | Performed by: NURSE PRACTITIONER

## 2025-04-09 PROCEDURE — 84100 ASSAY OF PHOSPHORUS: CPT | Performed by: NURSE PRACTITIONER

## 2025-04-09 RX ORDER — LOSARTAN POTASSIUM 50 MG/1
100 TABLET ORAL DAILY
Status: DISCONTINUED | OUTPATIENT
Start: 2025-04-09 | End: 2025-04-09 | Stop reason: HOSPADM

## 2025-04-09 RX ORDER — MIRABEGRON 25 MG/1
50 TABLET, FILM COATED, EXTENDED RELEASE ORAL DAILY
Start: 2025-04-09

## 2025-04-09 RX ORDER — AMLODIPINE BESYLATE 2.5 MG/1
5 TABLET ORAL DAILY
Status: DISCONTINUED | OUTPATIENT
Start: 2025-04-09 | End: 2025-04-09 | Stop reason: HOSPADM

## 2025-04-09 RX ORDER — ATORVASTATIN CALCIUM 40 MG/1
40 TABLET, FILM COATED ORAL DAILY
Qty: 90 TABLET | Refills: 3 | Status: SHIPPED | OUTPATIENT
Start: 2025-04-10 | End: 2026-04-10

## 2025-04-09 RX ADMIN — LEVOTHYROXINE SODIUM 75 MCG: 25 TABLET ORAL at 06:04

## 2025-04-09 RX ADMIN — LOSARTAN POTASSIUM 100 MG: 50 TABLET, FILM COATED ORAL at 01:04

## 2025-04-09 RX ADMIN — ATORVASTATIN CALCIUM 40 MG: 40 TABLET, FILM COATED ORAL at 10:04

## 2025-04-09 RX ADMIN — HEPARIN SODIUM 5000 UNITS: 5000 INJECTION INTRAVENOUS; SUBCUTANEOUS at 06:04

## 2025-04-09 RX ADMIN — ASPIRIN 81 MG: 81 TABLET, COATED ORAL at 10:04

## 2025-04-09 RX ADMIN — OXYBUTYNIN CHLORIDE 10 MG: 5 TABLET, EXTENDED RELEASE ORAL at 10:04

## 2025-04-09 RX ADMIN — AMLODIPINE BESYLATE 5 MG: 2.5 TABLET ORAL at 01:04

## 2025-04-09 NOTE — CONSULTS
[facility] Surg  Adult Nutrition   Consult Note (Nutrition Education)     SUMMARY     Nutrition Education     Previous Education: No    Diet at home: Adult Regular     Discussed with: Remote    Educational Need? With advanced age and low body weight for age, pt encouraged to consume foods preferred. MD consult for cardiac diet education (CHOL 216).     Barriers: Remote     Interventions: General, Healthful diet

## 2025-04-09 NOTE — ASSESSMENT & PLAN NOTE
TSH 21.013, T4 1.09  Patient fluctuates between 112 mcg and 125 mcg levothyroxine (currently on 112 mcg)   -Will increase dose to 125 mcg  Repeat TSH and T4 in 1 month

## 2025-04-09 NOTE — NURSING
Patient was not able to take 5 levothyroxine pills, she was only was able to swallow 3 and that took some time. MD was notified.

## 2025-04-09 NOTE — PT/OT/SLP EVAL
Occupational Therapy Evaluation       Name: Joann Saleem  MRN: [id number]  Admitting Diagnosis: Acute focal neurological deficit  Recent Surgery: * No surgery found *      Recommendations:     Discharge Recommendations: Low Intensity Therapy  Level of Assistance Recommended: 24 hours light assistance  Discharge Equipment Recommendations:    Barriers to discharge: None    Assessment:     Joann Saleem is a 89 y.o. female with a medical diagnosis of Acute focal neurological deficit. She presents with performance deficits affecting function including weakness, impaired endurance, impaired self care skills, impaired functional mobility, impaired balance, decreased safety awareness, decreased ROM.     Rehab Prognosis: Good; patient would benefit from acute OT services to address these deficits and reach maximum level of function.    Plan:     Patient to be seen 2 x/week to address the above listed problems via self-care/home management, therapeutic activities, therapeutic exercises, neuromuscular re-education  Plan of Care Expires:    Plan of Care Reviewed with: patient, caregiver, daughter    Subjective     Chief Complaint: L shoulder pain and muscle weakness  Patient Comments/Goals: return home with family member and to get strong enough to not have to use her walker again   Pain/Comfort:  Pain Rating 1: 2/10  Location - Side 1: Left  Location 1: shoulder    Patients cultural, spiritual, Sabianist conflicts given the current situation:      Social History:  Living Environment: Patient lives with their daughter in a single story home with number of outside stair(s): 7 stairs to the front but 2 on the side she uses by the garage  Prior Level of Function: Prior to admission, patient was modified independent and was modified independent with ADLs using no AD for mobility  Roles and Routines: Patient was not driving prior to admission.  Equipment Used at Home: bedside commode, walker, rolling, wheelchair, lift device,  commode, raised toilet, shower chair, grab bar  DME owned (not currently used): standard walker, bedside commode, shower chair, bath bench, wheelchair, and lift device  Assistance Upon Discharge: family    Objective:     Communicated with nursing prior to session. Patient found HOB elevated with bed alarm, blood pressure cuff, PureWick, peripheral IV, pulse ox (continuous), telemetry upon OT entry to room.    General Precautions: Standard, aphasia, pudding thick, hearing impaired, fall   Orthopedic Precautions:     Braces:      Respiratory Status: Room air    Occupational Performance    Gait belt applied - No    Bed Mobility:   Rolling/Turning to Left with contact guard assistance  Rolling/Turning to Right with contact guard assistance    Functional Mobility/Transfers:  Sit <> Stand Transfer with minimum assistance with no AD  Functional Mobility: posterior lean with mobility per PT report, Pt able to complete bed mobility and lift legs effectively for supine to EOB transfer    Activities of Daily Living:  Feeding: set up assistance and supervision    Cognitive/Visual Perceptual:  Visual/Perceptual:    -     Intact    Physical Exam:  Balance:    -     Standing: minimum assistance    AMPAC 6 Click ADL:  AMPAC Total Score: 16    Treatment & Education:  Patient educated on role of OT, POC, and goals for therapy  Patient educated on importance of OOB activities with staff member assistance and sitting OOB majority of the day  Pt was supine in bed for OT assessment. Agreed to treatment. BUE strength 3+/5 on LUE due to previous injury, 4/5 with RUE in all planes with  WNL, She did well with assessment but does have some limitation with verbal cues when assessing vision. She just required one extra explanation of directions. Mild aphasia with communication with speech somewhat slowed. She and caregiver requested ST and OT at home this time for HH as she was only seeing PT last time.     Patient clear to stand pivot  transfer with RN/PCT, assist x1 .    Patient left HOB elevated with all lines intact, call button in reach, RN notified, and bed alarm on.    GOALS:   Pt to increase OOB tolerance to 30 min per day seated in upright chair by brian.  Pt to increase LB dressing IND to set up/ SBA for donning socks and pants by priyankac  Pt to increase IND with stand pivot transfer in [place] [facility] [place]   Pt to complete toileting with SBA for all components (LB drsg, pericare and stand pivot) by d/c.         DME Justifications:  No DME recommended requiring DME justifications    History:     Past Medical History:   Diagnosis Date    Hypertension     Hypothyroidism, unspecified     Osteopetrosis     Stroke        History reviewed. No pertinent surgical history.    Time Tracking:     OT Date of Treatment: 04/09/25  OT Start Time: 1045  OT Stop Time: 1100  OT Total Time (min): 15 min    Billable Minutes: Evaluation 15    4/9/2025

## 2025-04-09 NOTE — ASSESSMENT & PLAN NOTE
Suspected TIA associated with slurred speech  UA not infectious  CT head obtained   MRI obtained  Vascular Neurology consulted  Echo performed - EF 65-70%, normal diastolic function, severe aortic stenosis (patient's dtr reports this is already known to them)  Will start statin for high cholesterol

## 2025-04-09 NOTE — PLAN OF CARE
Patient & daughter provided with follow up appointment with Brie Jimenez NP. Home health will be with [facility]. Called them to make sure they can provide ST which they can. Her daughter will provide transportation home.

## 2025-04-09 NOTE — PLAN OF CARE
Charts and orders reviewed. Pt to discharge home with [facility] home health. SOC date will be within the next 48 hours.  called Pt's PCP office to schedule Pt follow-up appointment; appointment scheduled (PCP: 4/16/25 @ 10:20AM) and PCP details added to AVS. Pt has no other needs to be addressed by case management. Pt cleared to discharge from case management standpoint.           04/09/25 1439   Final Note   Assessment Type Final Discharge Note   Anticipated Discharge Disposition Home-Health   What phone number can be called within the next 1-3 days to see how you are doing after discharge? [phone]   Hospital Resources/Appts/Education Provided Appointments scheduled and added to AVS   Post-Acute Status   Post-Acute Authorization Home Health   Home Health Status Set-up Complete/Auth obtained   Coverage MEDICARE - MEDICARE PART A & B   Discharge Delays None known at this time

## 2025-04-09 NOTE — ASSESSMENT & PLAN NOTE
Patient's blood pressure range in the last 24 hours was: BP  Min: 134/61  Max: 234/101.The patient's inpatient anti-hypertensive regimen is listed below:  Current Antihypertensives  labetalol 20 mg/4 mL (5 mg/mL) IV syring, Every 4 hours PRN, Intravenous  amLODIPine tablet 5 mg, Daily, Oral  losartan tablet 100 mg, Daily, Oral    Plan  - BP is controlled, no changes needed to their regimen  - per patient's daughter, patient does not usually have significantly high blood pressure and believes this is likely stress due to being in the ED

## 2025-04-09 NOTE — PT/OT/SLP EVAL
"Speech Language Pathology Evaluation  Cognitive/Bedside Swallow    Patient Name:  Joann Saleem   MRN:  [id number]  Admitting Diagnosis: Acute focal neurological deficit    Recommendations:                  General Recommendations:  Dysphagia therapy and Speech/language therapy  Diet recommendations:  Easy to Chew Diet - IDDSI Level 7 (chopped meats with gravy, meds in pudding), [place] Thick   Aspiration Precautions: 1 bite/sip at a time, Assistance with meals, Assistance with thickening liquids, Feed only when awake/alert, Frequent oral care, and HOB to 90 degrees   General Precautions: Standard    Assessment:     Joann Saleem is a 89 y.o. female with a primary diagnosis of acute focal neurological deficit. RN reported that patient has exhibited difficulties swallowing medications and coughing with thin liquids since admission. At onset of evaluation, patient awake and alert and daughter present at bedside. It was reported by daughter that patient experiences intermittent difficulties with thin liquids and pills, characterized as chronic. Kyphosis also reported as well. Daughter stated that patients speech intelligibility was very poor upon admission but improvements have been made as she has had time to rest. Speech observed to be 50% intelligible at bedside 2' to slowed articularly movement pattern and reduced loudness. No reported history of pneumonia.     S/s of aspiration evident with thin liquids 2' to expectorant cough reflex/throat clear immediately after swallow and poor bolus control/delayed initiation of swallow reflex. Patient characterized this as "it goes to the back of my throat too fast". When given mildly thickened liquids, s/s of aspiration not evident and patient reported no complaints. No overt s/s of aspiration noted with pureed textures. At this time, ST recommends easy to chew diet 2' to edentulous nature and mildly/nectar thickened liquids, medication crushed in pudding. Will continue to " "follow.     History:     Past Medical History:   Diagnosis Date    Hypertension     Hypothyroidism, unspecified     Osteopetrosis     Stroke        History reviewed. No pertinent surgical history.    Social History: Patient lives with her daughter.    Chest X-Rays: Results indicate "Mild chronic changes of interstitial fibrosis without focal consolidation."     Prior diet: regular textures, thin liquids.    Subjective   Patient seated at [facility] in 90 degree position. Daughter present at bedside. Patient pleasant and agreeable to evaluation.     Pain/Comfort:  Pain Rating 1: 0/10    Objective:     Cognitive Status:    Arousal/Alertness Appropriate response to stimuli  Attention No obvious deficits observed    Safety awareness WFL       Receptive Language:   Comprehension:      Questions Simple yes/no WFL  Conversation Simple WFL    Pragmatics:    turn taking WFL    Expressive Language:  Verbal:    Sentence formulation WFL      Motor Speech:  Intelligibility 50% intelligible - reduced 2' slowed rate and reduced loudness    Voice:   Intensity reduced    Visual-Spatial:  WFL    Oral Musculature Evaluation  Oral Musculature: general weakness  Dentition: edentulous  Secretion Management: adequate  Mucosal Quality: adequate  Mandibular Strength and Mobility: WNL  Oral Labial Strength and Mobility: WNL  Lingual Strength and Mobility: WNL  Volitional Cough: Reduced  Volitional Swallow: presbyphagia    Bedside Swallow Eval:   Consistencies Assessed:  Thin liquids 2 oz water by straw and cup rim - some assistance needed  Nectar thick liquids 1 oz water by cup rim - self administered   Puree 1 oz pudding by spoon - self administered       Oral Phase:   Thin Liquids  Decreased closure around utensil  Slow oral transit time    Pureed  WFL    Pharyngeal Phase:   Thin Liquids  coughing/choking  delayed swallow initation  throat clearing    Pureed  WFL    Compensatory Strategies  Thickening liquids    Treatment: Education provided at " bedside to patient and daughter regarding presbyphagia and the three pillars of aspiration pneumonia. ST provided safe swallowing precautions to implement upon discharge and gave education on monitoring and addressing s/s of aspiration within the context of the home. Patient and daughter verbalized understanding.     Goals:   Multidisciplinary Problems       SLP Goals          Problem: SLP    Goal Priority Disciplines Outcome   SLP Goal     SLP Progressing   Description: 1. Patient will maintain adequate hydration/nutrition with optimum safety and efficiency of swallowing function on P.O. intake without overt signs and symptoms of aspiration for the highest appropriate diet level.    2. Patient will develop functional and intelligible speech and utilize compensatory strategies through the use of adequate labial and lingual function, increased articulatory precision and speech prosody.                        Plan:     Patient to be seen:  3 x/week   Plan of Care expires:   4/23/25  Plan of Care reviewed with:  patient, caregiver, other (see comments) (RN, NP)   SLP Follow-Up:  Yes       Discharge recommendations:  Therapy Intensity Recommendations at Discharge: Low Intensity Therapy   Barriers to Discharge:  None    Time Tracking:     SLP Treatment Date:   04/09/25  Speech Start Time:  0932  Speech Stop Time:  1015     Speech Total Time (min):  43 min    Billable Minutes: Speech Therapy Individual 13 min, Eval Swallow and Oral Function 10 min, and Self Care/Home Management Training 20 min    04/09/2025

## 2025-04-09 NOTE — NURSING
took bp prior to administering hydralazine iv. bp now 207/97. Notified dr young. please just give the hydralazine so that we can get some sort of control over the blood pressure per dr young.

## 2025-04-09 NOTE — HOSPITAL COURSE
Joann Saleem is a 89 year old female w/ PMH stroke, HTN, and hypothyroidism. She was admitted to the hospital for management of an acute focal neurological deficit/suspected TIA. Vascular neurology was consulted. Head CT and brain MRI performed with no acute stroke noted. PT/OT consulted and recommended low intensity therapy. SLP consulted with recommendations to continue speech therapy at home. Patient assessed on day of discharge and still had slurred speech but per daughter, it is getting better. No other neuro deficits noted. Patient deemed stable to discharge. Patient and daughter agreeable to plan and eager to discharge.

## 2025-04-09 NOTE — NURSING
bp 148/67 map 97 now after hydralazine given about 2254 last night notified dr young. No new orders.

## 2025-04-09 NOTE — DISCHARGE SUMMARY
[facility] Medicine  Discharge Summary      Patient Name: Joann Saleem  MRN: [id number]  [facility]: [id number]  Patient Class: OP- Observation  Admission Date: 4/8/2025  Hospital Length of Stay: 0 days  Discharge Date and Time: 04/09/2025 3:05 PM  Attending Physician: Francisco Banks MD   Discharging Provider: Sara Bruno NP  Primary Care Provider: Brie Jimenez FNP    Primary Care Team: Networked reference to record PCT     HPI:   Joann Saleem is a 89 year old female w/ PMH hypothyroidism, stroke, and HTN.  She presented to the ED for evaluation and treatment of possible stroke.  Her daughter reported that she went to bed around 9:00 p.m. last night in her usual state of health, but this morning when she woke up around 10:00 a.m. her speech was thick and slurred.  Patient's daughter states that the patient had similar symptoms when she woke up yesterday, but the symptoms resolved quickly on their own.  She and the patient deny any other obvious neurological deficits.  Code stroke was initiated on arrival to the ED. CT head performed with no findings of acute stroke.  MRI also performed with no findings of acute stroke.  Patient has had a stroke in the past and occasionally has difficulty swallowing thin liquids, but this is a chronic problem.  Patient will be admitted to hospital medicine for overnight monitoring and PT/OT evaluation.    * No surgery found *      Hospital Course:   Joann Saleem is a 89 year old female w/ PMH stroke, HTN, and hypothyroidism. She was admitted to the hospital for management of an acute focal neurological deficit/suspected TIA. Vascular neurology was consulted. Head CT and brain MRI performed with no acute stroke noted. PT/OT consulted and recommended low intensity therapy. SLP consulted with recommendations to continue speech therapy at home. Patient assessed on day of discharge and still had slurred speech but per daughter, it is getting better. No  other neuro deficits noted. Patient deemed stable to discharge. Patient and daughter agreeable to plan and eager to discharge.      Goals of Care Treatment Preferences:  Code Status: DNR         Consults:   Consults (From admission, onward)          Status Ordering Provider     Inpatient consult to Physical Medicine Rehab  Once        Provider:  (Not yet assigned)    Acknowledged HERMAN WORLEY     Inpatient consult to Registered Dietitian/Nutritionist  Once        Provider:  (Not yet assigned)    Completed HERMAN WORLEY     IP consult to case management/social work  Once        Provider:  (Not yet assigned)    Completed HERMAN WORLEY     Consult to Telemedicine - Acute Stroke  Once        Provider:  (Not yet assigned)    Completed CINDY KOWALSKI            Assessment & Plan  Acute focal neurological deficit  Suspected TIA associated with slurred speech  UA not infectious  CT head obtained   MRI obtained  Vascular Neurology consulted  Echo performed - EF 65-70%, normal diastolic function, severe aortic stenosis (patient's dtr reports this is already known to them)  Will start statin for high cholesterol    Hypertension  Patient's blood pressure range in the last 24 hours was: BP  Min: 134/61  Max: 234/101.The patient's inpatient anti-hypertensive regimen is listed below:  Current Antihypertensives  labetalol 20 mg/4 mL (5 mg/mL) IV syring, Every 4 hours PRN, Intravenous  amLODIPine tablet 5 mg, Daily, Oral  losartan tablet 100 mg, Daily, Oral    Plan  - BP is controlled, no changes needed to their regimen  - per patient's daughter, patient does not usually have significantly high blood pressure and believes this is likely stress due to being in the ED    Hypothyroidism, unspecified  TSH 21.013, T4 1.09  Patient fluctuates between 112 mcg and 125 mcg levothyroxine (currently on 112 mcg)   -Will increase dose to 125 mcg  Repeat TSH and T4 in 1 month    Suspected TIA (transient ischemic attack)  Suspected TIA  associated with slurred speech  Antithrombotics for secondary stroke prevention: Antiplatelets: Aspirin: 81 mg daily    Statins for secondary stroke prevention and hyperlipidemia, if present:   Statins: Atorvastatin- 40 mg daily    Aggressive risk factor modification: HTN     Rehab efforts: The patient has been evaluated by a stroke team provider and the therapy needs have been fully considered based off the presenting complaints and exam findings. The following therapy evaluations are needed: PT evaluate and treat, OT evaluate and treat, SLP evaluate and treat    Diagnostics ordered/pending: Lipid Profile to assess cholesterol levels, MRI head without contrast to assess brain parenchyma, TTE to assess cardiac function/status     VTE prophylaxis: Heparin 5000 units SQ every 8 hours    BP parameters: TIA: SBP <220 until imaging confirmation of no infarct     Discussed management with patient's daughter.  She is open to starting a statin/aspirin and obtaining echo.  However, she does not want extraordinary measures done given patient's age  ACP (advance care planning)  Advance Care Planning     Date: 04/08/2025    Code Status  In light of the patients advanced and life limiting illness,I engaged the the patient and family in a voluntary conversation about the patient's preferences for care  at the very end of life. The patient wishes to have a natural, peaceful death.  Along those lines, the patient does not wish to have CPR or other invasive treatments performed when her heart and/or breathing stops. I communicated to the patient and family that a DNR order would be placed in her medical record to reflect this preference.    A total of 5 min was spent on advance care planning, goals of care discussion, emotional support, formulating and communicating prognosis and exploring burden/benefit of various approaches of treatment. This discussion occurred on a fully voluntary basis with the verbal consent of the patient  and/or family.         Final Active Diagnoses:    Diagnosis Date Noted POA    PRINCIPAL PROBLEM:  Acute focal neurological deficit [R29.818] 04/08/2025 Yes    Suspected TIA (transient ischemic attack) [G45.9] 04/08/2025 Yes    ACP (advance care planning) [Z71.89] 04/08/2025 Not Applicable    Hypertension [I10]  Yes    Hypothyroidism, unspecified [E03.9]  Yes      Problems Resolved During this Admission:       Discharged Condition: stable    Disposition: Home-Health Care [facility]    Follow Up:   Follow-up Information       [facility] Follow up.    Why: will provide home health services  Contact information:  [address] [place] [id number]  [phone]             Brie Jimenez FNP. Go on 4/16/2025.    Specialty: Family Medicine  Why: appointment Wed April 16th at 10:20am for hospital follow up  Contact information:  [address] [facility] [place] [address]  [phone]                           Patient Instructions:      TSH   Standing Status: Future Standing Exp. Date: 04/09/26     Order Specific Question Answer Comments   Send normal result to authorizing provider's In Basket if patient is active on MyChart: Yes      T4, Free   Standing Status: Future Standing Exp. Date: 07/08/26     Order Specific Question Answer Comments   Send normal result to authorizing provider's In Basket if patient is active on MyChart: Yes        Significant Diagnostic Studies: Labs: CMP   Recent Labs   Lab 04/08/25  1145 04/09/25  0357    141   K 3.8 3.6    111*   CO2 23 20*   BUN 14 16   CREATININE 1.3 1.1   CALCIUM 9.1 8.7   ALBUMIN 3.7 3.3*   BILITOT 1.3* 1.8*   ALKPHOS 51 47   AST 17 18   ALT 5* <5*   ANIONGAP 9 10   , CBC   Recent Labs   Lab 04/08/25  1145 04/09/25  0357   WBC 4.68 5.71   HGB 13.7 12.8   HCT 40.2 37.9    156   , and Lipid Panel   Lab Results   Component Value Date    CHOL 216 (H) 04/08/2025    HDL 59 04/08/2025    TRIG 102 04/08/2025    CHOLHDL 27.3  04/08/2025     Radiology: Brain MRI:   1. Motion artifact limits evaluation.  2. Cortical atrophy with extensive periventricular deep white matter changes consistent with advanced chronic small vessel ischemic disease.  3. Remote lacunar infarcts of the bilateral basal ganglia.  CT scan:  CT head w/o:  Cortical atrophy with periventricular deep white matter change consistent with chronic small vessel ischemic disease.     Chronic lacunar infarcts of the basal ganglia.  Cardiac Graphics: Echocardiogram: Transthoracic echo (TTE) complete (Cupid Only):   Results for orders placed or performed during the hospital encounter of 04/08/25   Echo   Result Value Ref Range    BSA 1.33 m2    LVOT stroke volume 35.7 cm3    LVIDd 3.5 3.5 - 6.0 cm    LV Systolic Volume 17 mL    LV Systolic Volume Index 12.8 mL/m2    LVIDs 2.2 2.1 - 4.0 cm    LV ESV A2C 9.11 mL    LV Diastolic Volume 49 mL    LV ESV A4C 15.43 mL    LV Diastolic Volume Index 36.84 mL/m2    Left Ventricular End Systolic Volume by Teichholz Method 16.79 mL    Left Ventricular End Diastolic Volume by Teichholz Method 49.39 mL    IVS 0.9 0.6 - 1.1 cm    LVOT diameter 1.5 cm    LVOT area 1.8 cm2    FS 37.1 28 - 44 %    Left Ventricle Relative Wall Thickness 0.51 cm    PW 0.9 0.6 - 1.1 cm    LV mass 88.8 g    LV Mass Index 66.8 g/m2    MV Peak E Juan Manuel 0.77 m/s    TDI LATERAL 0.06 m/s    TDI SEPTAL 0.13 m/s    E/E' ratio 8 m/s    MV Peak A Juan Manuel 0.81 m/s    TR Max Juan Manuel 1.8 m/s    E/A ratio 0.95     E wave deceleration time 277 msec    LV SEPTAL E/E' RATIO 5.9 m/s    LV LATERAL E/E' RATIO 12.8 m/s    LVOT peak juan manuel 0.8 m/s    Left Ventricular Outflow Tract Mean Velocity 0.65 cm/s    Left Ventricular Outflow Tract Mean Gradient 1.89 mmHg    RV- wynn basal diam 2.1 cm    RV-wynn mid d 1.2 cm    RV Basal Diameter 4.62 cm    RV-wnyn length 4.6 cm    RV mid diameter 1.22 cm    RV/LV Ratio 0.60 cm    LA size 3.1 cm    Left Atrium Minor Axis 2.2 cm    Left Atrium Major Axis 3.1 cm    LA  Vol (MOD) 14 mL    TERRENCE (MOD) 11 mL/m2    RA Major Axis 3.09 cm    RA Width 2.22 cm    AV mean gradient 8 mmHg    AV peak gradient 12 mmHg    Ao peak sylvie 1.7 m/s    Ao VTI 42.4 cm    LVOT peak VTI 20.2 cm    AV valve area 0.8 cm²    AV Velocity Ratio 0.47     AV index (prosthetic) 0.48     ELLEN by Velocity Ratio 0.8 cm²    Mr max sylvie 2.45 m/s    MV mean gradient 3 mmHg    MV peak gradient 5 mmHg    MV stenosis pressure 1/2 time 70.73 ms    MV valve area p 1/2 method 3.11 cm2    MV valve area by continuity eq 1.51 cm2    MV VTI 23.7 cm    Tricuspid valve peak A wave velocity [id number] m/s    TV peak E sylvie 0.4 m/s    Triscuspid Valve Regurgitation Peak Gradient 14 mmHg    PV PEAK VELOCITY 0.88 m/s    PV peak gradient 3 mmHg    Pulmonary Valve Mean Velocity 0.64 m/s    Ao root annulus 1.27 cm    Sinus 2.16 cm    STJ 2.26 cm    Ascending aorta 2.35 cm    IVC diameter 0.95 cm    Mean e' 0.10 m/s    ZLVIDS -1.53     ZLVIDD -2.05     LA area A4C 8.42 cm2    LA area A2C 5.14 cm2    RVDD 2.10 cm    AORTIC VALVE CUSP SEPERATION 1.30 cm    EF 66 %    TAPSE 1.30 cm    TV resting pulmonary artery pressure 16 mmHg    RV TB RVSP 5 mmHg    Est. RA pres 3 mmHg    Narrative      Left Ventricle: Left ventricle was not well visualized due to poor   sonic window. The left ventricle is normal in size. Normal wall thickness.   There is normal systolic function with a visually estimated ejection   fraction of 65 - 70%. Ejection fraction is approximately 66%. There is   normal diastolic function.    Right Ventricle: Right ventricle was not well visualized due to poor   acoustic window. The right ventricle is normal in size. Systolic function   is normal. TAPSE is 1.30 cm.    Left Atrium: Left atrium was not well visualized.    Aortic Valve: There is severe stenosis. Aortic valve area by VTI is 0.8   cm². Aortic valve peak velocity is 1.7 m/s. Mean gradient is 8 mmHg. The   dimensionless index is 0.48.    IVC/SVC: Normal venous  pressure at 3 mmHg.    Pericardium: There is a small effusion. No indication of cardiac   tamponade.    Difficult windows, Lumason contrast used for wall motion analysis.          Pending Diagnostic Studies:       Procedure Component Value Units Date/Time    EXTRA TUBES [[id number]]     Order Status: Sent Lab Status: No result     Specimen: Blood, Venous     Narrative:      The following orders were created for panel order EXTRA TUBES.  Procedure                               Abnormality         Status                     ---------                               -----------         ------                     Gold Top Hold[[id number]]                                                                Please view results for these tests on the individual orders.    Gold Top Hold [[id number]]     Order Status: Sent Lab Status: No result     Specimen: Blood, Venous            Medications:  Reconciled Home Medications:      Medication List        START taking these medications      atorvastatin 40 MG tablet  Commonly known as: LIPITOR  Take 1 tablet (40 mg total) by mouth once daily.  Start taking on: April 10, 2025            CHANGE how you take these medications      levothyroxine 125 MCG tablet  Commonly known as: SYNTHROID  Take 125 mcg by mouth once daily.  What changed: how much to take     olmesartan 40 MG tablet  Commonly known as: BENICAR  Take 40 mg by mouth once daily.  What changed: Another medication with the same name was removed. Continue taking this medication, and follow the directions you see here.            CONTINUE taking these medications      amLODIPine 5 MG tablet  Commonly known as: NORVASC  Take 5 mg by mouth.     aspirin 81 MG EC tablet  Commonly known as: ECOTRIN  Take 81 mg by mouth.     * chlorthalidone 25 MG Tab  Commonly known as: HYGROTEN  Take 25 mg by mouth once daily.     * chlorthalidone 25 MG Tab  Commonly known as: HYGROTEN  1/2 to 1 tab po prn     ergocalciferol 50,000 unit  Cap  Commonly known as: ERGOCALCIFEROL  Take 50,000 Units by mouth every 7 days.     * bimatoprost 0.01 % Drop  Commonly known as: LUMIGAN  Apply 1 drop to eye nightly.     * LUMIGAN 0.01 % Drop  Generic drug: bimatoprost  Place 1 drop into both eyes nightly.     meclizine 25 mg tablet  Commonly known as: ANTIVERT  Take 1 tablet (25 mg total) by mouth 3 (three) times daily as needed for Dizziness.     MYRBETRIQ 25 mg Tb24 ER tablet  Generic drug: mirabegron  Take 2 tablets (50 mg total) by mouth once daily.           * This list has 4 medication(s) that are the same as other medications prescribed for you. Read the directions carefully, and ask your doctor or other care provider to review them with you.                STOP taking these medications      cefUROXime 250 MG tablet  Commonly known as: CEFTIN     methenamine 1 gram Tab  Commonly known as: HIPREX              Indwelling Lines/Drains at time of discharge:   Lines/Drains/Airways       Drain  Duration             Female External Urinary Catheter w/ Suction 04/08/25 4977 1 day                    Time spent on the discharge of patient: 20 minutes         Sara Bruno NP  Department of Hospital Medicine  [facility]

## 2025-04-09 NOTE — PT/OT/SLP EVAL
"Physical Therapy Evaluation     Patient Name: Joann Saleem   MRN: [id number]  Recent Surgery: * No surgery found *      Recommendations:     Discharge Recommendations: Low Intensity Therapy (HH PT with 24 hour Supervision/Assist)   Discharge Equipment Recommendations: none   Barriers to discharge: None    Assessment:     Joann Saleem is a 89 y.o. female admitted with a medical diagnosis of Acute focal neurological deficit. She presents with the following impairments/functional limitations: weakness, impaired endurance, impaired self care skills, impaired functional mobility, gait instability, impaired balance, decreased coordination, decreased ROM, impaired coordination, impaired muscle length.     Rehab Prognosis: Good; patient would benefit from acute PT services to address these deficits and reach maximum level of function.    Plan:     During this hospitalization, patient to be seen 5 x/week to address the above listed problems via gait training, therapeutic activities, therapeutic exercises    Plan of Care Expires:  (At time of hospital discharge)    Subjective     Chief Complaint: Patient reports she's doing better than she was yesterday.   Patient Comments/Goals: "I like to walk in my shoes." (Shoes not at hospital)   Pain/Comfort:  Pain Rating 1: 0/10    Social History:  Living Environment: Patient lives with their daughter in a single story home with number of outside stair(s): 1 with hand rail and ramped  Prior Level of Function: Prior to admission, patient ambulated household distances using rollator; w/c for community distances; primarily homebound  Equipment Used at Home: wheelchair, rollator, shower chair, other (see comments) (adjustable bed, lift chair, gait belt)  DME owned (not currently used): bedside commode  Assistance Upon Discharge: family    Objective:     Communicated with nursing prior to session. Patient found supine with bed alarm, PureWick, telemetry, Other (comments) (family " present) upon PT entry to room.    General Precautions: Standard, fall   Orthopedic Precautions:     Braces:      Respiratory Status: Room air    Exams:  Cognition: Patient is oriented to Person, Place, and Situation  RLE ROM:  hip/knee: WFL, ankle: to neutral  RLE Strength:  Grossly 4+/5  LLE ROM:  hip/knee: WFL, ankle: to neutral  LLE Strength:  Grossly 4/5    Functional Mobility:  Gait belt applied - Yes  Bed Mobility  Rolling Left: minimum assistance  Scooting: minimum assistance and of 2 persons  Supine to Sit: maximal assistance for LE management and trunk management  Sit to Supine: moderate assistance for LE management  Transfers  Sit to Stand: minimum assistance with rolling walker and with cues for hand placement and foot placement  Gait  Patient ambulated 15 with rolling walker and minimum assistance. Patient demonstrates decreased step length, narrow base of support, decreased weight shift, decreased foot clearance, flexed posture, decreased markus, and posterior/(R) lateral lean during gait. Balance improved with further gait.   Balance  Sitting:  static: Min A, dynamic: Mod A; patient with posterior pelvic tilt, increased thoracic kyphosis, forward head, posterior lean  Standing:  Mod A initially secondary to posterior/(R) lean progressed to Min A    Therapeutic Activities and Exercises:   Patient educated on role of acute care PT and PT POC    AM-PAC 6 CLICK MOBILITY  Total Score:12    Patient left supine with all lines intact, call button in reach, RN notified, bed alarm on, and family present.    GOALS:   Multidisciplinary Problems       Physical Therapy Goals          Problem: Physical Therapy    Goal Priority Disciplines Outcome Interventions   Physical Therapy Goal     PT, PT/OT     Description: Patient will meet the following goals prior to discharge:   1. Perform bed mobility Min A.   2. Perform functional transfers Min A.   3 Gait Min A with RW x 50 feet.                        DME  Justifications:  No DME recommended requiring DME justifications    History:     Past Medical History:   Diagnosis Date    Hypertension     Hypothyroidism, unspecified     Osteopetrosis     Stroke        History reviewed. No pertinent surgical history.    Time Tracking:     PT Received On: 04/09/25  PT Start Time: 1010  PT Stop Time: 1039  PT Total Time (min): 29 min     Billable Minutes: Evaluation 29 4/9/2025

## 2025-04-09 NOTE — ASSESSMENT & PLAN NOTE
Advance Care Planning     Date: 04/08/2025    Code Status  In light of the patients advanced and life limiting illness,I engaged the the patient and family in a voluntary conversation about the patient's preferences for care  at the very end of life. The patient wishes to have a natural, peaceful death.  Along those lines, the patient does not wish to have CPR or other invasive treatments performed when her heart and/or breathing stops. I communicated to the patient and family that a DNR order would be placed in her medical record to reflect this preference.    A total of 5 min was spent on advance care planning, goals of care discussion, emotional support, formulating and communicating prognosis and exploring burden/benefit of various approaches of treatment. This discussion occurred on a fully voluntary basis with the verbal consent of the patient and/or family.

## 2025-04-09 NOTE — ED NOTES
Call placed to med-surg unit, notifed patient will be transported to assigned bed 127. Staff agreeable to patient transport to assigned unit

## 2025-04-09 NOTE — DISCHARGE INSTRUCTIONS
[facility]      HOME HEALTH ORDERS  FACE TO FACE ENCOUNTER    Patient Name: Joann Saleem  YOB: 1930    PCP: Brie Jimenez FNP   PCP Address: [address] [place] [id number]  PCP Phone Number: [phone]  PCP Fax: [phone]    Encounter Date: 4/8/25    Admit to Home Health    Diagnoses:  Active Hospital Problems    Diagnosis  POA    *Acute focal neurological deficit [R29.818]  Yes    Suspected TIA (transient ischemic attack) [G45.9]  Yes    ACP (advance care planning) [Z71.89]  Not Applicable    Hypertension [I10]  Yes    Hypothyroidism, unspecified [E03.9]  Yes      Resolved Hospital Problems   No resolved problems to display.       Follow Up Appointments:  No future appointments.    Allergies:  Review of patient's allergies indicates:   Allergen Reactions    Pcn [penicillins]     Sulfa (sulfonamide antibiotics)        Medications: Review discharge medications with patient and family and provide education.    Current Facility-Administered Medications   Medication Dose Route Frequency Provider Last Rate Last Admin    amLODIPine tablet 5 mg  5 mg Oral Daily Sara Bruno NP   5 mg at 04/09/25 1359    aspirin EC tablet 81 mg  81 mg Oral Daily Sara Bruno NP   81 mg at 04/09/25 1053    atorvastatin tablet 40 mg  40 mg Oral Daily Sara Bruno NP   40 mg at 04/09/25 1054    heparin (porcine) injection 5,000 Units  5,000 Units Subcutaneous Q8H Sara Bruno NP   5,000 Units at 04/09/25 0619    labetalol 20 mg/4 mL (5 mg/mL) IV syring  10 mg Intravenous Q4H PRN Sara Bruno NP        levothyroxine tablet 125 mcg  125 mcg Oral Before breakfast Sara Bruno NP   75 mcg at 04/09/25 0619    losartan tablet 100 mg  100 mg Oral Daily Sara Bruno NP   100 mg at 04/09/25 1359    magnesium oxide tablet 800 mg  800 mg Oral PRN Sara Bruno NP        magnesium oxide tablet 800 mg  800 mg Oral PRN Sara Bruno NP        ondansetron injection 4 mg  4 mg Intravenous Q12H  PRN Sara Bruno NP        oxybutynin 24 hr tablet 10 mg  10 mg Oral Daily Sara Bruno NP   10 mg at 04/09/25 1054    potassium bicarbonate disintegrating tablet 35 mEq  35 mEq Oral PRN Sara Bruno NP        potassium bicarbonate disintegrating tablet 50 mEq  50 mEq Oral PRN Sara Bruno NP        potassium bicarbonate disintegrating tablet 60 mEq  60 mEq Oral PRN Sara Bruno NP        potassium, sodium phosphates 280-160-250 mg packet 2 packet  2 packet Oral PRN Sara Bruno NP        potassium, sodium phosphates 280-160-250 mg packet 2 packet  2 packet Oral PRN Sara Bruno NP        potassium, sodium phosphates 280-160-250 mg packet 2 packet  2 packet Oral PRN Sara Bruno NP        sulfur hexafluoride microspheres injection 2.4 mL  2.4 mL Intravenous ONCE PRN Deepak Rodarte MD            Medication List        START taking these medications      atorvastatin 40 MG tablet  Commonly known as: LIPITOR  Take 1 tablet (40 mg total) by mouth once daily.  Start taking on: April 10, 2025            CHANGE how you take these medications      levothyroxine 125 MCG tablet  Commonly known as: SYNTHROID  Take 125 mcg by mouth once daily.  What changed: how much to take     olmesartan 40 MG tablet  Commonly known as: BENICAR  Take 40 mg by mouth once daily.  What changed: Another medication with the same name was removed. Continue taking this medication, and follow the directions you see here.            CONTINUE taking these medications      amLODIPine 5 MG tablet  Commonly known as: NORVASC  Take 5 mg by mouth.     aspirin 81 MG EC tablet  Commonly known as: ECOTRIN  Take 81 mg by mouth.     * chlorthalidone 25 MG Tab  Commonly known as: HYGROTEN  Take 25 mg by mouth once daily.     * chlorthalidone 25 MG Tab  Commonly known as: HYGROTEN  1/2 to 1 tab po prn     ergocalciferol 50,000 unit Cap  Commonly known as: ERGOCALCIFEROL  Take 50,000 Units by mouth every 7 days.     * bimatoprost 0.01 %  Drop  Commonly known as: LUMIGAN  Apply 1 drop to eye nightly.     * LUMIGAN 0.01 % Drop  Generic drug: bimatoprost  Place 1 drop into both eyes nightly.     meclizine 25 mg tablet  Commonly known as: ANTIVERT  Take 1 tablet (25 mg total) by mouth 3 (three) times daily as needed for Dizziness.     MYRBETRIQ 25 mg Tb24 ER tablet  Generic drug: mirabegron  Take 2 tablets (50 mg total) by mouth once daily.           * This list has 4 medication(s) that are the same as other medications prescribed for you. Read the directions carefully, and ask your doctor or other care provider to review them with you.                STOP taking these medications      cefUROXime 250 MG tablet  Commonly known as: CEFTIN     methenamine 1 gram Tab  Commonly known as: HIPREX                I have seen and examined this patient within the last 30 days. My clinical findings that support the need for the home health skilled services and home bound status are the following:no   Weakness/numbness causing balance and gait disturbance due to Weakness/Debility making it taxing to leave home.     Diet:   cardiac diet and fluid consistency diet  nectar thick    Labs:  N/A    Referrals/ Consults  Physical Therapy to evaluate and treat. Evaluate for home safety and equipment needs; Establish/upgrade home exercise program. Perform / instruct on therapeutic exercises, gait training, transfer training, and Range of Motion.  Occupational Therapy to evaluate and treat. Evaluate home environment for safety and equipment needs. Perform/Instruct on transfers, ADL training, ROM, and therapeutic exercises.  Speech Therapy  to evaluate and treat for  Language and Swallowing.    Activities:   activity as tolerated    Nursing:   Agency to admit patient within 24 hours of hospital discharge unless specified on physician order or at patient request    SN to complete comprehensive assessment including routine vital signs. Instruct on disease process and s/s of  complications to report to MD. Review/verify medication list sent home with the patient at time of discharge  and instruct patient/caregiver as needed. Frequency may be adjusted depending on start of care date.     Skilled nurse to perform up to 3 visits PRN for symptoms related to diagnosis    Notify MD if SBP > 160 or < 90; DBP > 90 or < 50; HR > 120 or < 50; Temp > 101; O2 < 88%    Ok to schedule additional visits based on staff availability and patient request on consecutive days within the home health episode.    When multiple disciplines ordered:    Start of Care occurs on Sunday - Wednesday schedule remaining discipline evaluations as ordered on separate consecutive days following the start of care.    Thursday SOC -schedule subsequent evaluations Friday and Monday the following week.     Friday - Saturday SOC - schedule subsequent discipline evaluations on consecutive days starting Monday of the following week.    For all post-discharge communication and subsequent orders please contact patient's primary care physician. If unable to reach primary care physician or do not receive response within 30 minutes, please contact hospital for clinical staff order clarification    Miscellaneous   Other per protocol    Home Health Aide:  Physical Therapy Three times weekly, Occupational Therapy Three times weekly, and Speech Language Pathology Three times weekly    Wound Care Orders  no    I certify that this patient is confined to her home and needs physical therapy, speech therapy, and occupational therapy.

## 2025-04-09 NOTE — ED NOTES
A call was placed to the med-surg unit, and the staff was notified that the patient will be transported to assigned bed 127. The staff has agreed to the patients transport to the assigned unit.

## 2025-04-09 NOTE — PLAN OF CARE
Problem: Stroke, Ischemic (Includes Transient Ischemic Attack)  Goal: Optimal Coping  Outcome: Met  Goal: Effective Bowel Elimination  Outcome: Met  Goal: Optimal Cerebral Tissue Perfusion  Outcome: Met  Goal: Optimal Cognitive Function  Outcome: Met  Goal: Improved Communication Skills  Outcome: Met  Goal: Optimal Functional Ability  Outcome: Met  Goal: Optimal Nutrition Intake  Outcome: Met  Goal: Effective Oxygenation and Ventilation  Outcome: Met  Goal: Improved Sensorimotor Function  Outcome: Met  Goal: Safe and Effective Swallow  Outcome: Met  Goal: Effective Urinary Elimination  Outcome: Met     Problem: Fall Injury Risk  Goal: Absence of Fall and Fall-Related Injury  Outcome: Met     Problem: Adult Inpatient Plan of Care  Goal: Plan of Care Review  Outcome: Met  Goal: Patient-Specific Goal (Individualized)  Outcome: Met  Goal: Absence of Hospital-Acquired Illness or Injury  Outcome: Met  Goal: Optimal Comfort and Wellbeing  Outcome: Met  Goal: Readiness for Transition of Care  Outcome: Met     Problem: Infection  Goal: Absence of Infection Signs and Symptoms  Outcome: Met     Problem: Skin Injury Risk Increased  Goal: Skin Health and Integrity  Outcome: Met

## 2025-05-08 ENCOUNTER — LAB REQUISITION (OUTPATIENT)
Dept: LAB | Facility: HOSPITAL | Age: OVER 89
End: 2025-05-08
Payer: MEDICARE

## 2025-05-08 DIAGNOSIS — N39.0 URINARY TRACT INFECTION, SITE NOT SPECIFIED: ICD-10-CM

## 2025-05-08 LAB
BACTERIA #/AREA URNS HPF: ABNORMAL /HPF
BILIRUB UR QL STRIP.AUTO: NEGATIVE
CLARITY UR: ABNORMAL
COLOR UR AUTO: YELLOW
GLUCOSE UR QL STRIP: NEGATIVE
HGB UR QL STRIP: ABNORMAL
KETONES UR QL STRIP: ABNORMAL
LEUKOCYTE ESTERASE UR QL STRIP: ABNORMAL
MICROSCOPIC COMMENT: ABNORMAL
NITRITE UR QL STRIP: NEGATIVE
PH UR STRIP: 7 [PH]
PROT UR QL STRIP: ABNORMAL
RBC #/AREA URNS HPF: 10 /HPF (ref 0–4)
SP GR UR STRIP: 1.02
SQUAMOUS #/AREA URNS HPF: 10 /HPF
UROBILINOGEN UR STRIP-ACNC: 1 EU/DL
WBC #/AREA URNS HPF: 25 /HPF (ref 0–5)

## 2025-05-08 PROCEDURE — 81003 URINALYSIS AUTO W/O SCOPE: CPT

## 2025-05-08 PROCEDURE — 87086 URINE CULTURE/COLONY COUNT: CPT

## 2025-05-11 LAB — BACTERIA UR CULT: ABNORMAL

## 2025-05-23 ENCOUNTER — LAB REQUISITION (OUTPATIENT)
Dept: LAB | Facility: HOSPITAL | Age: OVER 89
End: 2025-05-23
Payer: MEDICARE

## 2025-05-23 DIAGNOSIS — N39.0 URINARY TRACT INFECTION, SITE NOT SPECIFIED: ICD-10-CM

## 2025-05-23 LAB
AMM URATE CRY URNS QL MICRO: ABNORMAL
AMORPH CRY URNS QL MICRO: ABNORMAL
BACTERIA #/AREA URNS HPF: ABNORMAL /HPF
BILIRUB UR QL STRIP.AUTO: NEGATIVE
CA CARBONATE CRY URNS QL MICRO: ABNORMAL
CA PHOS CRY URNS QL MICRO: ABNORMAL
CAOX CRY URNS QL MICRO: ABNORMAL
CLARITY UR: CLEAR
COLOR UR AUTO: YELLOW
EPITH CASTS #/AREA URNS LPF: 0 /LPF (ref ?–0)
FATTY CASTS #/AREA URNS LPF: 0 /LPF (ref ?–0)
GLUCOSE UR QL STRIP: NEGATIVE
GRAN CASTS #/AREA URNS LPF: 0 /LPF (ref ?–0)
HGB UR QL STRIP: ABNORMAL
HOLD SPECIMEN: NORMAL
HYALINE CASTS #/AREA URNS LPF: 0 /LPF (ref 0–1)
KETONES UR QL STRIP: NEGATIVE
LEUKOCYTE ESTERASE UR QL STRIP: NEGATIVE
MICROSCOPIC COMMENT: ABNORMAL
NITRITE UR QL STRIP: NEGATIVE
NON-SQ EPI CELLS #/AREA URNS HPF: 0 /HPF
OTHER ELEMENTS URNS MICRO: ABNORMAL
PH UR STRIP: 7 [PH]
PROT UR QL STRIP: NEGATIVE
RBC #/AREA URNS HPF: 70 /HPF (ref 0–4)
RBC CASTS #/AREA URNS LPF: 0 /LPF (ref ?–0)
SP GR UR STRIP: 1.01
SQUAMOUS #/AREA URNS HPF: 0 /HPF
TRI-PHOS CRY URNS QL MICRO: ABNORMAL
TRICHOMONAS UR QL MICRO: ABNORMAL /HPF
UNIDENT CRYS URNS QL MICRO: ABNORMAL
URATE CRY URNS QL MICRO: ABNORMAL
UROBILINOGEN UR STRIP-ACNC: 1 EU/DL
WAXY CASTS #/AREA URNS LPF: 0 /LPF (ref ?–0)
WBC #/AREA URNS HPF: 5 /HPF (ref 0–5)
WBC CASTS #/AREA URNS LPF: 0 /LPF (ref ?–0)
WBC CLUMPS URNS QL MICRO: ABNORMAL
YEAST URNS QL MICRO: ABNORMAL /HPF

## 2025-05-23 PROCEDURE — 81003 URINALYSIS AUTO W/O SCOPE: CPT

## 2025-06-11 ENCOUNTER — PATIENT MESSAGE (OUTPATIENT)
Dept: HEPATOLOGY | Facility: HOSPITAL | Age: OVER 89
End: 2025-06-11
Payer: MEDICARE

## 2025-07-02 ENCOUNTER — LAB VISIT (OUTPATIENT)
Dept: LAB | Facility: HOSPITAL | Age: OVER 89
End: 2025-07-02
Attending: NURSE PRACTITIONER
Payer: MEDICARE

## 2025-07-02 DIAGNOSIS — M81.0 SENILE OSTEOPOROSIS: Primary | ICD-10-CM

## 2025-07-02 DIAGNOSIS — R53.1 ASTHENIA: ICD-10-CM

## 2025-07-02 DIAGNOSIS — R62.7 ADULT FAILURE TO THRIVE: ICD-10-CM

## 2025-07-02 DIAGNOSIS — E03.9 HYPOTHYROIDISM, UNSPECIFIED TYPE: ICD-10-CM

## 2025-07-02 LAB
25(OH)D3+25(OH)D2 SERPL-MCNC: 18 NG/ML (ref 30–96)
ABSOLUTE EOSINOPHIL (OHS): 0.16 K/UL
ABSOLUTE MONOCYTE (OHS): 0.44 K/UL (ref 0.3–1)
ABSOLUTE NEUTROPHIL COUNT (OHS): 4.21 K/UL (ref 1.8–7.7)
ALBUMIN SERPL BCP-MCNC: 3.5 G/DL (ref 3.5–5.2)
ALP SERPL-CCNC: 48 UNIT/L (ref 40–150)
ALT SERPL W/O P-5'-P-CCNC: 9 UNIT/L (ref 10–44)
ANION GAP (OHS): 16 MMOL/L (ref 8–16)
AST SERPL-CCNC: 18 UNIT/L (ref 11–45)
BASOPHILS # BLD AUTO: 0.06 K/UL
BASOPHILS NFR BLD AUTO: 1 %
BILIRUB SERPL-MCNC: 1.3 MG/DL (ref 0.1–1)
BUN SERPL-MCNC: 21 MG/DL (ref 10–30)
CALCIUM SERPL-MCNC: 9.5 MG/DL (ref 8.7–10.5)
CHLORIDE SERPL-SCNC: 105 MMOL/L (ref 95–110)
CO2 SERPL-SCNC: 19 MMOL/L (ref 23–29)
CREAT SERPL-MCNC: 1.2 MG/DL (ref 0.5–1.4)
ERYTHROCYTE [DISTWIDTH] IN BLOOD BY AUTOMATED COUNT: 12.8 % (ref 11.5–14.5)
GFR SERPLBLD CREATININE-BSD FMLA CKD-EPI: 42 ML/MIN/1.73/M2
GLUCOSE SERPL-MCNC: 92 MG/DL (ref 70–110)
HCT VFR BLD AUTO: 42.5 % (ref 37–48.5)
HGB BLD-MCNC: 14 GM/DL (ref 12–16)
IMM GRANULOCYTES # BLD AUTO: 0.03 K/UL (ref 0–0.04)
IMM GRANULOCYTES NFR BLD AUTO: 0.5 % (ref 0–0.5)
LYMPHOCYTES # BLD AUTO: 1 K/UL (ref 1–4.8)
MCH RBC QN AUTO: 30.8 PG (ref 27–31)
MCHC RBC AUTO-ENTMCNC: 32.9 G/DL (ref 32–36)
MCV RBC AUTO: 94 FL (ref 82–98)
NUCLEATED RBC (/100WBC) (OHS): 0 /100 WBC
PLATELET # BLD AUTO: 199 K/UL (ref 150–450)
PMV BLD AUTO: 10.5 FL (ref 9.2–12.9)
POTASSIUM SERPL-SCNC: 4.4 MMOL/L (ref 3.5–5.1)
PROT SERPL-MCNC: 6.9 GM/DL (ref 6–8.4)
RBC # BLD AUTO: 4.54 M/UL (ref 4–5.4)
RELATIVE EOSINOPHIL (OHS): 2.7 %
RELATIVE LYMPHOCYTE (OHS): 16.9 % (ref 18–48)
RELATIVE MONOCYTE (OHS): 7.5 % (ref 4–15)
RELATIVE NEUTROPHIL (OHS): 71.4 % (ref 38–73)
SODIUM SERPL-SCNC: 140 MMOL/L (ref 136–145)
T4 FREE SERPL-MCNC: 2.85 NG/DL (ref 0.71–1.51)
T4 FREE SERPL-MCNC: 2.85 NG/DL (ref 0.71–1.51)
TSH SERPL-ACNC: <0.01 UIU/ML (ref 0.4–4)
WBC # BLD AUTO: 5.9 K/UL (ref 3.9–12.7)

## 2025-07-02 PROCEDURE — 84443 ASSAY THYROID STIM HORMONE: CPT

## 2025-07-02 PROCEDURE — 82040 ASSAY OF SERUM ALBUMIN: CPT

## 2025-07-02 PROCEDURE — 85025 COMPLETE CBC W/AUTO DIFF WBC: CPT

## 2025-07-02 PROCEDURE — 36415 COLL VENOUS BLD VENIPUNCTURE: CPT

## 2025-07-02 PROCEDURE — 82306 VITAMIN D 25 HYDROXY: CPT
